# Patient Record
Sex: MALE | Race: BLACK OR AFRICAN AMERICAN | NOT HISPANIC OR LATINO | URBAN - METROPOLITAN AREA
[De-identification: names, ages, dates, MRNs, and addresses within clinical notes are randomized per-mention and may not be internally consistent; named-entity substitution may affect disease eponyms.]

---

## 2019-09-08 ENCOUNTER — EMERGENCY (EMERGENCY)
Facility: HOSPITAL | Age: 34
LOS: 0 days | Discharge: LEFT BEFORE DISCHARGE | End: 2019-09-08
Attending: EMERGENCY MEDICINE
Payer: COMMERCIAL

## 2019-09-08 VITALS
RESPIRATION RATE: 18 BRPM | OXYGEN SATURATION: 98 % | DIASTOLIC BLOOD PRESSURE: 100 MMHG | TEMPERATURE: 99 F | HEART RATE: 126 BPM | SYSTOLIC BLOOD PRESSURE: 165 MMHG

## 2019-09-08 VITALS — WEIGHT: 209 LBS | HEIGHT: 66 IN

## 2019-09-08 DIAGNOSIS — N50.811 RIGHT TESTICULAR PAIN: ICD-10-CM

## 2019-09-08 DIAGNOSIS — N50.819 TESTICULAR PAIN, UNSPECIFIED: ICD-10-CM

## 2019-09-08 DIAGNOSIS — R36.1 HEMATOSPERMIA: ICD-10-CM

## 2019-09-08 DIAGNOSIS — Z11.3 ENCOUNTER FOR SCREENING FOR INFECTIONS WITH A PREDOMINANTLY SEXUAL MODE OF TRANSMISSION: ICD-10-CM

## 2019-09-08 LAB
APPEARANCE UR: CLEAR — SIGNIFICANT CHANGE UP
BILIRUB UR-MCNC: NEGATIVE — SIGNIFICANT CHANGE UP
COLOR SPEC: YELLOW — SIGNIFICANT CHANGE UP
DIFF PNL FLD: ABNORMAL
GLUCOSE UR QL: NEGATIVE MG/DL — SIGNIFICANT CHANGE UP
KETONES UR-MCNC: NEGATIVE — SIGNIFICANT CHANGE UP
LEUKOCYTE ESTERASE UR-ACNC: NEGATIVE — SIGNIFICANT CHANGE UP
NITRITE UR-MCNC: NEGATIVE — SIGNIFICANT CHANGE UP
PH UR: 6 — SIGNIFICANT CHANGE UP (ref 5–8)
PROT UR-MCNC: 30 MG/DL
SP GR SPEC: 1.02 — SIGNIFICANT CHANGE UP (ref 1.01–1.02)
UROBILINOGEN FLD QL: NEGATIVE MG/DL — SIGNIFICANT CHANGE UP

## 2019-09-08 PROCEDURE — 87491 CHLMYD TRACH DNA AMP PROBE: CPT

## 2019-09-08 PROCEDURE — 99284 EMERGENCY DEPT VISIT MOD MDM: CPT

## 2019-09-08 PROCEDURE — 87086 URINE CULTURE/COLONY COUNT: CPT

## 2019-09-08 PROCEDURE — 81001 URINALYSIS AUTO W/SCOPE: CPT

## 2019-09-08 PROCEDURE — 87591 N.GONORRHOEAE DNA AMP PROB: CPT

## 2019-09-08 PROCEDURE — 99285 EMERGENCY DEPT VISIT HI MDM: CPT

## 2019-09-08 RX ORDER — IBUPROFEN 200 MG
600 TABLET ORAL ONCE
Refills: 0 | Status: DISCONTINUED | OUTPATIENT
Start: 2019-09-08 | End: 2019-09-08

## 2019-09-08 NOTE — ED STATDOCS - PROGRESS NOTE DETAILS
Pt reportedly became angry after hearing what his bill may be from registration and left ED immediately after that, requesting everything be cancelled.  Pt left quickly without seeing nursing or MD and hence did not sign AMA form.

## 2019-09-08 NOTE — ED ADULT NURSE NOTE - OBJECTIVE STATEMENT
PT C/O testicular pain started 2 weeks ago. pt states when he is ejaculating he noticed blood. no urinary complains. pt denies insertion of any object to his penis.

## 2019-09-08 NOTE — ED ADULT NURSE NOTE - CHPI ED NUR SYMPTOMS NEG
no vomiting/no nausea/no tingling/no weakness/no chills/no dizziness/no fever/no decreased eating/drinking

## 2019-09-09 LAB
C TRACH RRNA SPEC QL NAA+PROBE: SIGNIFICANT CHANGE UP
CULTURE RESULTS: SIGNIFICANT CHANGE UP
N GONORRHOEA RRNA SPEC QL NAA+PROBE: SIGNIFICANT CHANGE UP
SPECIMEN SOURCE: SIGNIFICANT CHANGE UP
SPECIMEN SOURCE: SIGNIFICANT CHANGE UP

## 2020-01-30 NOTE — ED STATDOCS - GENITOURINARY, MLM
normal... no bladder tenderness, no bilateral CVA tenderness.  penis and BL testicles normal in appearance,  normal cremasteric reflex.  normal scrotum, no rash R shoulder

## 2020-06-11 NOTE — ED ADULT NURSE NOTE - NS ED NOTE ABUSE RESPONSE YN
Central Prior Authorization Team  Phone: [phone]    PA Initiation    Medication: (LANTUS SOLOSTAR) 100 UNIT/ML pen  Insurance Company: [employer] - Phone [phone] Fax [phone]  Pharmacy Filling the Rx: [facility] [place], [place] - [address], [id number]  Filling Pharmacy Phone: [phone]  Filling Pharmacy Fax:    Start Date: 6/11/2020         Yes

## 2021-02-10 PROBLEM — Z78.9 OTHER SPECIFIED HEALTH STATUS: Chronic | Status: ACTIVE | Noted: 2019-09-08

## 2021-02-12 ENCOUNTER — NON-APPOINTMENT (OUTPATIENT)
Age: 36
End: 2021-02-12

## 2021-02-12 ENCOUNTER — APPOINTMENT (OUTPATIENT)
Dept: INTERNAL MEDICINE | Facility: CLINIC | Age: 36
End: 2021-02-12
Payer: COMMERCIAL

## 2021-02-12 VITALS — SYSTOLIC BLOOD PRESSURE: 150 MMHG | DIASTOLIC BLOOD PRESSURE: 100 MMHG

## 2021-02-12 VITALS
SYSTOLIC BLOOD PRESSURE: 160 MMHG | BODY MASS INDEX: 35.03 KG/M2 | WEIGHT: 218 LBS | TEMPERATURE: 98 F | OXYGEN SATURATION: 98 % | HEIGHT: 66 IN | HEART RATE: 115 BPM | DIASTOLIC BLOOD PRESSURE: 116 MMHG

## 2021-02-12 DIAGNOSIS — Z23 ENCOUNTER FOR IMMUNIZATION: ICD-10-CM

## 2021-02-12 DIAGNOSIS — Z78.9 OTHER SPECIFIED HEALTH STATUS: ICD-10-CM

## 2021-02-12 DIAGNOSIS — Z11.3 ENCOUNTER FOR SCREENING FOR INFECTIONS WITH A PREDOMINANTLY SEXUAL MODE OF TRANSMISSION: ICD-10-CM

## 2021-02-12 DIAGNOSIS — Z82.49 FAMILY HISTORY OF ISCHEMIC HEART DISEASE AND OTHER DISEASES OF THE CIRCULATORY SYSTEM: ICD-10-CM

## 2021-02-12 DIAGNOSIS — Z11.59 ENCOUNTER FOR SCREENING FOR OTHER VIRAL DISEASES: ICD-10-CM

## 2021-02-12 DIAGNOSIS — Z00.00 ENCOUNTER FOR GENERAL ADULT MEDICAL EXAMINATION W/OUT ABNORMAL FINDINGS: ICD-10-CM

## 2021-02-12 PROCEDURE — 36415 COLL VENOUS BLD VENIPUNCTURE: CPT

## 2021-02-12 PROCEDURE — 99385 PREV VISIT NEW AGE 18-39: CPT | Mod: 25

## 2021-02-12 PROCEDURE — 93000 ELECTROCARDIOGRAM COMPLETE: CPT

## 2021-02-12 PROCEDURE — 99072 ADDL SUPL MATRL&STAF TM PHE: CPT

## 2021-02-16 DIAGNOSIS — E55.9 VITAMIN D DEFICIENCY, UNSPECIFIED: ICD-10-CM

## 2021-02-16 LAB
25(OH)D3 SERPL-MCNC: 10.4 NG/ML
ALBUMIN SERPL ELPH-MCNC: 4.7 G/DL
ALP BLD-CCNC: 53 U/L
ALT SERPL-CCNC: 41 U/L
ANION GAP SERPL CALC-SCNC: 13 MMOL/L
AST SERPL-CCNC: 32 U/L
BASOPHILS # BLD AUTO: 0.03 K/UL
BASOPHILS NFR BLD AUTO: 0.6 %
BILIRUB SERPL-MCNC: 0.2 MG/DL
BUN SERPL-MCNC: 13 MG/DL
C TRACH RRNA SPEC QL NAA+PROBE: NOT DETECTED
CALCIUM SERPL-MCNC: 10.3 MG/DL
CHLORIDE SERPL-SCNC: 100 MMOL/L
CHOLEST SERPL-MCNC: 274 MG/DL
CO2 SERPL-SCNC: 24 MMOL/L
CREAT SERPL-MCNC: 0.97 MG/DL
EOSINOPHIL # BLD AUTO: 0.06 K/UL
EOSINOPHIL NFR BLD AUTO: 1.2 %
ESTIMATED AVERAGE GLUCOSE: 126 MG/DL
GLUCOSE SERPL-MCNC: 92 MG/DL
HAV IGM SER QL: NONREACTIVE
HBA1C MFR BLD HPLC: 6 %
HBV CORE IGM SER QL: NONREACTIVE
HBV SURFACE AG SER QL: NONREACTIVE
HCT VFR BLD CALC: 46.8 %
HCV AB SER QL: NONREACTIVE
HCV S/CO RATIO: 0.07 S/CO
HDLC SERPL-MCNC: 55 MG/DL
HGB BLD-MCNC: 15.3 G/DL
HIV1+2 AB SPEC QL IA.RAPID: NONREACTIVE
IMM GRANULOCYTES NFR BLD AUTO: 0.2 %
LDLC SERPL CALC-MCNC: NORMAL MG/DL
LYMPHOCYTES # BLD AUTO: 1.6 K/UL
LYMPHOCYTES NFR BLD AUTO: 33.2 %
MAN DIFF?: NORMAL
MCHC RBC-ENTMCNC: 30.7 PG
MCHC RBC-ENTMCNC: 32.7 GM/DL
MCV RBC AUTO: 94 FL
MONOCYTES # BLD AUTO: 0.52 K/UL
MONOCYTES NFR BLD AUTO: 10.8 %
N GONORRHOEA RRNA SPEC QL NAA+PROBE: NOT DETECTED
NEUTROPHILS # BLD AUTO: 2.6 K/UL
NEUTROPHILS NFR BLD AUTO: 54 %
NONHDLC SERPL-MCNC: 219 MG/DL
PLATELET # BLD AUTO: 312 K/UL
POTASSIUM SERPL-SCNC: 4.3 MMOL/L
PROT SERPL-MCNC: 7.4 G/DL
RBC # BLD: 4.98 M/UL
RBC # FLD: 14.6 %
SARS-COV-2 IGG SERPL IA-ACNC: 0.07 INDEX
SARS-COV-2 IGG SERPL QL IA: NEGATIVE
SARS-COV-2 N GENE NPH QL NAA+PROBE: NOT DETECTED
SODIUM SERPL-SCNC: 138 MMOL/L
SOURCE AMPLIFICATION: NORMAL
T PALLIDUM AB SER QL IA: NEGATIVE
TRIGL SERPL-MCNC: 651 MG/DL
TSH SERPL-ACNC: 2.38 UIU/ML
WBC # FLD AUTO: 4.82 K/UL

## 2021-02-16 RX ORDER — ERGOCALCIFEROL 1.25 MG/1
1.25 MG CAPSULE, LIQUID FILLED ORAL
Qty: 6 | Refills: 0 | Status: ACTIVE | COMMUNITY
Start: 2021-02-16 | End: 1900-01-01

## 2021-02-17 ENCOUNTER — APPOINTMENT (OUTPATIENT)
Dept: INTERNAL MEDICINE | Facility: CLINIC | Age: 36
End: 2021-02-17
Payer: COMMERCIAL

## 2021-02-17 VITALS
OXYGEN SATURATION: 98 % | WEIGHT: 218 LBS | HEIGHT: 66 IN | SYSTOLIC BLOOD PRESSURE: 144 MMHG | HEART RATE: 87 BPM | TEMPERATURE: 98.1 F | BODY MASS INDEX: 35.03 KG/M2 | DIASTOLIC BLOOD PRESSURE: 96 MMHG

## 2021-02-17 VITALS — DIASTOLIC BLOOD PRESSURE: 95 MMHG | SYSTOLIC BLOOD PRESSURE: 150 MMHG

## 2021-02-17 DIAGNOSIS — R00.0 TACHYCARDIA, UNSPECIFIED: ICD-10-CM

## 2021-02-17 PROCEDURE — 99213 OFFICE O/P EST LOW 20 MIN: CPT | Mod: 25

## 2021-02-17 PROCEDURE — 99072 ADDL SUPL MATRL&STAF TM PHE: CPT

## 2021-02-17 PROCEDURE — 36415 COLL VENOUS BLD VENIPUNCTURE: CPT

## 2021-02-17 RX ORDER — METOPROLOL SUCCINATE 25 MG/1
25 TABLET, EXTENDED RELEASE ORAL
Qty: 30 | Refills: 0 | Status: DISCONTINUED | COMMUNITY
Start: 2021-02-12 | End: 2021-02-17

## 2021-02-18 ENCOUNTER — APPOINTMENT (OUTPATIENT)
Dept: CARDIOLOGY | Facility: CLINIC | Age: 36
End: 2021-02-18

## 2021-02-18 LAB
CHOLEST SERPL-MCNC: 288 MG/DL
ESTIMATED AVERAGE GLUCOSE: 131 MG/DL
HBA1C MFR BLD HPLC: 6.2 %
HDLC SERPL-MCNC: 53 MG/DL
LDLC SERPL CALC-MCNC: 181 MG/DL
NONHDLC SERPL-MCNC: 235 MG/DL
TRIGL SERPL-MCNC: 274 MG/DL

## 2021-02-23 ENCOUNTER — APPOINTMENT (OUTPATIENT)
Dept: INTERNAL MEDICINE | Facility: CLINIC | Age: 36
End: 2021-02-23
Payer: COMMERCIAL

## 2021-02-23 VITALS
DIASTOLIC BLOOD PRESSURE: 89 MMHG | HEIGHT: 66 IN | OXYGEN SATURATION: 97 % | TEMPERATURE: 98.5 F | BODY MASS INDEX: 35.36 KG/M2 | WEIGHT: 220 LBS | HEART RATE: 76 BPM | SYSTOLIC BLOOD PRESSURE: 135 MMHG

## 2021-02-23 VITALS — SYSTOLIC BLOOD PRESSURE: 140 MMHG | DIASTOLIC BLOOD PRESSURE: 88 MMHG

## 2021-02-23 PROCEDURE — 99072 ADDL SUPL MATRL&STAF TM PHE: CPT

## 2021-02-23 PROCEDURE — 99213 OFFICE O/P EST LOW 20 MIN: CPT

## 2021-03-01 ENCOUNTER — APPOINTMENT (OUTPATIENT)
Dept: CARDIOLOGY | Facility: CLINIC | Age: 36
End: 2021-03-01
Payer: COMMERCIAL

## 2021-03-01 ENCOUNTER — NON-APPOINTMENT (OUTPATIENT)
Age: 36
End: 2021-03-01

## 2021-03-01 VITALS
OXYGEN SATURATION: 97 % | BODY MASS INDEX: 35.68 KG/M2 | DIASTOLIC BLOOD PRESSURE: 123 MMHG | WEIGHT: 222 LBS | HEIGHT: 66 IN | SYSTOLIC BLOOD PRESSURE: 163 MMHG | HEART RATE: 95 BPM | TEMPERATURE: 97.7 F

## 2021-03-01 VITALS
WEIGHT: 222 LBS | OXYGEN SATURATION: 97 % | TEMPERATURE: 97.7 F | HEART RATE: 95 BPM | BODY MASS INDEX: 35.83 KG/M2 | DIASTOLIC BLOOD PRESSURE: 123 MMHG | SYSTOLIC BLOOD PRESSURE: 163 MMHG

## 2021-03-01 DIAGNOSIS — R00.0 TACHYCARDIA, UNSPECIFIED: ICD-10-CM

## 2021-03-01 DIAGNOSIS — R73.03 PREDIABETES.: ICD-10-CM

## 2021-03-01 PROCEDURE — 93000 ELECTROCARDIOGRAM COMPLETE: CPT

## 2021-03-01 PROCEDURE — 99204 OFFICE O/P NEW MOD 45 MIN: CPT

## 2021-03-01 PROCEDURE — 99072 ADDL SUPL MATRL&STAF TM PHE: CPT

## 2021-03-11 ENCOUNTER — APPOINTMENT (OUTPATIENT)
Dept: CARDIOLOGY | Facility: CLINIC | Age: 36
End: 2021-03-11

## 2021-03-16 ENCOUNTER — APPOINTMENT (OUTPATIENT)
Dept: INTERNAL MEDICINE | Facility: CLINIC | Age: 36
End: 2021-03-16

## 2021-04-05 ENCOUNTER — APPOINTMENT (OUTPATIENT)
Dept: CARDIOLOGY | Facility: CLINIC | Age: 36
End: 2021-04-05

## 2021-04-05 NOTE — REASON FOR VISIT
[FreeTextEntry1] : This is the first office visit for this 35-year-old black male who was referred because of hypertension and sinus tachycardia.  The patient was recently placed on Toprol-XL.  First he was on 25 mg and then was increased to 2 tablets daily.  Upon questioning the patient also has a problem with snoring according to his girlfriend and he sometimes feels like he is waking up with shortness of breath..  He does have 3 caffeinated beverages each day.  He has on average four alcoholic beverages on the weekend.  He was never  a smoker.\par \par His mother has a history of hypertension.\par \par He takes Toprol-XL 50 mg and vitamin D.\par \par He has no known drug allergies.\par \par He denies any chest pains or palpitations but does have the previously mentioned shortness of breath intermittently when he wakes up.

## 2021-04-05 NOTE — PHYSICAL EXAM
[General Appearance - Well Developed] : well developed [Normal Appearance] : normal appearance [Well Groomed] : well groomed [No Deformities] : no deformities [General Appearance - In No Acute Distress] : no acute distress [Normal Conjunctiva] : the conjunctiva exhibited no abnormalities [Eyelids - No Xanthelasma] : the eyelids demonstrated no xanthelasmas [Normal Oral Mucosa] : normal oral mucosa [No Oral Pallor] : no oral pallor [No Oral Cyanosis] : no oral cyanosis [Normal Jugular Venous A Waves Present] : normal jugular venous A waves present [Normal Jugular Venous V Waves Present] : normal jugular venous V waves present [No Jugular Venous Blackburn A Waves] : no jugular venous blackburn A waves [Heart Rate And Rhythm] : heart rate and rhythm were normal [Heart Sounds] : normal S1 and S2 [Murmurs] : no murmurs present [Respiration, Rhythm And Depth] : normal respiratory rhythm and effort [Exaggerated Use Of Accessory Muscles For Inspiration] : no accessory muscle use [Auscultation Breath Sounds / Voice Sounds] : lungs were clear to auscultation bilaterally [Abnormal Walk] : normal gait [Gait - Sufficient For Exercise Testing] : the gait was sufficient for exercise testing [Nail Clubbing] : no clubbing of the fingernails [Cyanosis, Localized] : no localized cyanosis [Petechial Hemorrhages (___cm)] : no petechial hemorrhages [Skin Color & Pigmentation] : normal skin color and pigmentation [] : no rash [No Venous Stasis] : no venous stasis [Skin Lesions] : no skin lesions [No Skin Ulcers] : no skin ulcer [No Xanthoma] : no  xanthoma was observed [Oriented To Time, Place, And Person] : oriented to person, place, and time [Affect] : the affect was normal [Mood] : the mood was normal [No Anxiety] : not feeling anxious [FreeTextEntry1] : obese

## 2021-04-05 NOTE — DISCUSSION/SUMMARY
[FreeTextEntry1] : The patient was examined.  His blood pressure was 163/123, and his pulse was 95.  His lungs were clear to auscultation.  Cardiac exam was negative for murmurs rubs or gallops.  His EKG showed normal sinus rhythm and was within normal limits.  The patient was told to gradually and slowly cut down on all caffeine intake.  We will send the patient for an echocardiogram for LV size and function.  He gives a history that is highly suspicious for obstructive sleep apnea.  He probably should be referred for a sleep study to a pulmonologist for this.  We will increase his Toprol-XL to 100 mg daily.  He will return in 1 month or earlier if needed.  Please note, I have spent a total of 50 minutes on the date of the encounter evaluating and treating the patient.

## 2021-11-21 ENCOUNTER — INPATIENT (INPATIENT)
Facility: HOSPITAL | Age: 36
LOS: 4 days | Discharge: HOME | DRG: 964 | End: 2021-11-26
Attending: SURGERY | Admitting: SURGERY
Payer: SELF-PAY

## 2021-11-21 VITALS
TEMPERATURE: 99 F | SYSTOLIC BLOOD PRESSURE: 138 MMHG | HEART RATE: 115 BPM | HEIGHT: 66 IN | RESPIRATION RATE: 24 BRPM | OXYGEN SATURATION: 98 % | DIASTOLIC BLOOD PRESSURE: 99 MMHG | WEIGHT: 220.02 LBS
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
## 2021-11-21 DIAGNOSIS — R74.02 ELEVATION OF LEVELS OF LACTIC ACID DEHYDROGENASE [LDH]: ICD-10-CM

## 2021-11-21 DIAGNOSIS — S20.20XA CONTUSION OF THORAX, UNSPECIFIED, INITIAL ENCOUNTER: ICD-10-CM

## 2021-11-21 DIAGNOSIS — N28.0 ISCHEMIA AND INFARCTION OF KIDNEY: ICD-10-CM

## 2021-11-21 DIAGNOSIS — F10.10 ALCOHOL ABUSE, UNCOMPLICATED: ICD-10-CM

## 2021-11-21 DIAGNOSIS — R79.89 OTHER SPECIFIED ABNORMAL FINDINGS OF BLOOD CHEMISTRY: ICD-10-CM

## 2021-11-21 DIAGNOSIS — S37.019A MINOR CONTUSION OF UNSPECIFIED KIDNEY, INITIAL ENCOUNTER: ICD-10-CM

## 2021-11-21 LAB
ALBUMIN SERPL ELPH-MCNC: 3.9 G/DL — SIGNIFICANT CHANGE UP (ref 3.3–5)
ALP SERPL-CCNC: 50 U/L — SIGNIFICANT CHANGE UP (ref 40–120)
ALT FLD-CCNC: 155 U/L — HIGH (ref 12–78)
ANION GAP SERPL CALC-SCNC: 11 MMOL/L — SIGNIFICANT CHANGE UP (ref 5–17)
APTT BLD: 27.5 SEC — SIGNIFICANT CHANGE UP (ref 27.5–35.5)
AST SERPL-CCNC: 267 U/L — HIGH (ref 15–37)
BASOPHILS # BLD AUTO: 0.01 K/UL — SIGNIFICANT CHANGE UP (ref 0–0.2)
BASOPHILS NFR BLD AUTO: 0.1 % — SIGNIFICANT CHANGE UP (ref 0–2)
BILIRUB SERPL-MCNC: 0.6 MG/DL — SIGNIFICANT CHANGE UP (ref 0.2–1.2)
BUN SERPL-MCNC: 20 MG/DL — SIGNIFICANT CHANGE UP (ref 7–23)
CALCIUM SERPL-MCNC: 9.6 MG/DL — SIGNIFICANT CHANGE UP (ref 8.5–10.1)
CHLORIDE SERPL-SCNC: 104 MMOL/L — SIGNIFICANT CHANGE UP (ref 96–108)
CO2 SERPL-SCNC: 22 MMOL/L — SIGNIFICANT CHANGE UP (ref 22–31)
CREAT SERPL-MCNC: 1.82 MG/DL — HIGH (ref 0.5–1.3)
EOSINOPHIL # BLD AUTO: 0 K/UL — SIGNIFICANT CHANGE UP (ref 0–0.5)
EOSINOPHIL NFR BLD AUTO: 0 % — SIGNIFICANT CHANGE UP (ref 0–6)
ETHANOL SERPL-MCNC: 41 MG/DL — HIGH (ref 0–10)
GLUCOSE SERPL-MCNC: 139 MG/DL — HIGH (ref 70–99)
HCT VFR BLD CALC: 42.7 % — SIGNIFICANT CHANGE UP (ref 39–50)
HGB BLD-MCNC: 14.1 G/DL — SIGNIFICANT CHANGE UP (ref 13–17)
IMM GRANULOCYTES NFR BLD AUTO: 0.3 % — SIGNIFICANT CHANGE UP (ref 0–1.5)
INR BLD: 1.07 RATIO — SIGNIFICANT CHANGE UP (ref 0.88–1.16)
LACTATE SERPL-SCNC: 3.3 MMOL/L — HIGH (ref 0.7–2)
LIDOCAIN IGE QN: 265 U/L — SIGNIFICANT CHANGE UP (ref 73–393)
LYMPHOCYTES # BLD AUTO: 1.02 K/UL — SIGNIFICANT CHANGE UP (ref 1–3.3)
LYMPHOCYTES # BLD AUTO: 11.6 % — LOW (ref 13–44)
MCHC RBC-ENTMCNC: 30.3 PG — SIGNIFICANT CHANGE UP (ref 27–34)
MCHC RBC-ENTMCNC: 33 GM/DL — SIGNIFICANT CHANGE UP (ref 32–36)
MCV RBC AUTO: 91.8 FL — SIGNIFICANT CHANGE UP (ref 80–100)
MONOCYTES # BLD AUTO: 0.73 K/UL — SIGNIFICANT CHANGE UP (ref 0–0.9)
MONOCYTES NFR BLD AUTO: 8.3 % — SIGNIFICANT CHANGE UP (ref 2–14)
NEUTROPHILS # BLD AUTO: 7 K/UL — SIGNIFICANT CHANGE UP (ref 1.8–7.4)
NEUTROPHILS NFR BLD AUTO: 79.7 % — HIGH (ref 43–77)
PLATELET # BLD AUTO: 319 K/UL — SIGNIFICANT CHANGE UP (ref 150–400)
POTASSIUM SERPL-MCNC: 4.1 MMOL/L — SIGNIFICANT CHANGE UP (ref 3.5–5.3)
POTASSIUM SERPL-SCNC: 4.1 MMOL/L — SIGNIFICANT CHANGE UP (ref 3.5–5.3)
PROT SERPL-MCNC: 7.8 GM/DL — SIGNIFICANT CHANGE UP (ref 6–8.3)
PROTHROM AB SERPL-ACNC: 12.4 SEC — SIGNIFICANT CHANGE UP (ref 10.6–13.6)
RBC # BLD: 4.65 M/UL — SIGNIFICANT CHANGE UP (ref 4.2–5.8)
RBC # FLD: 16 % — HIGH (ref 10.3–14.5)
SODIUM SERPL-SCNC: 137 MMOL/L — SIGNIFICANT CHANGE UP (ref 135–145)
WBC # BLD: 8.79 K/UL — SIGNIFICANT CHANGE UP (ref 3.8–10.5)
WBC # FLD AUTO: 8.79 K/UL — SIGNIFICANT CHANGE UP (ref 3.8–10.5)

## 2021-11-21 PROCEDURE — 70486 CT MAXILLOFACIAL W/O DYE: CPT | Mod: 26,MA

## 2021-11-21 PROCEDURE — 93010 ELECTROCARDIOGRAM REPORT: CPT

## 2021-11-21 PROCEDURE — 83605 ASSAY OF LACTIC ACID: CPT

## 2021-11-21 PROCEDURE — 85025 COMPLETE CBC W/AUTO DIFF WBC: CPT

## 2021-11-21 PROCEDURE — 72125 CT NECK SPINE W/O DYE: CPT | Mod: 26,MA

## 2021-11-21 PROCEDURE — 80048 BASIC METABOLIC PNL TOTAL CA: CPT

## 2021-11-21 PROCEDURE — 73110 X-RAY EXAM OF WRIST: CPT | Mod: 26,RT

## 2021-11-21 PROCEDURE — 97116 GAIT TRAINING THERAPY: CPT | Mod: GP

## 2021-11-21 PROCEDURE — 99221 1ST HOSP IP/OBS SF/LOW 40: CPT

## 2021-11-21 PROCEDURE — 76376 3D RENDER W/INTRP POSTPROCES: CPT | Mod: 26

## 2021-11-21 PROCEDURE — 97162 PT EVAL MOD COMPLEX 30 MIN: CPT | Mod: GP

## 2021-11-21 PROCEDURE — 86769 SARS-COV-2 COVID-19 ANTIBODY: CPT

## 2021-11-21 PROCEDURE — 36415 COLL VENOUS BLD VENIPUNCTURE: CPT

## 2021-11-21 PROCEDURE — 84100 ASSAY OF PHOSPHORUS: CPT

## 2021-11-21 PROCEDURE — 81001 URINALYSIS AUTO W/SCOPE: CPT

## 2021-11-21 PROCEDURE — 74177 CT ABD & PELVIS W/CONTRAST: CPT | Mod: 26,MA

## 2021-11-21 PROCEDURE — 85018 HEMOGLOBIN: CPT

## 2021-11-21 PROCEDURE — 74177 CT ABD & PELVIS W/CONTRAST: CPT

## 2021-11-21 PROCEDURE — 99285 EMERGENCY DEPT VISIT HI MDM: CPT

## 2021-11-21 PROCEDURE — 85014 HEMATOCRIT: CPT

## 2021-11-21 PROCEDURE — 82550 ASSAY OF CK (CPK): CPT

## 2021-11-21 PROCEDURE — 70450 CT HEAD/BRAIN W/O DYE: CPT | Mod: 26,MA

## 2021-11-21 PROCEDURE — 83735 ASSAY OF MAGNESIUM: CPT

## 2021-11-21 PROCEDURE — 73130 X-RAY EXAM OF HAND: CPT | Mod: 26,RT

## 2021-11-21 PROCEDURE — 71260 CT THORAX DX C+: CPT | Mod: 26,MA

## 2021-11-21 PROCEDURE — 73090 X-RAY EXAM OF FOREARM: CPT | Mod: 26,RT

## 2021-11-21 PROCEDURE — 85027 COMPLETE CBC AUTOMATED: CPT

## 2021-11-21 RX ORDER — ONDANSETRON 8 MG/1
4 TABLET, FILM COATED ORAL ONCE
Refills: 0 | Status: COMPLETED | OUTPATIENT
Start: 2021-11-21 | End: 2021-11-21

## 2021-11-21 RX ORDER — MORPHINE SULFATE 50 MG/1
4 CAPSULE, EXTENDED RELEASE ORAL ONCE
Refills: 0 | Status: DISCONTINUED | OUTPATIENT
Start: 2021-11-21 | End: 2021-11-21

## 2021-11-21 RX ORDER — ONDANSETRON 8 MG/1
4 TABLET, FILM COATED ORAL EVERY 6 HOURS
Refills: 0 | Status: DISCONTINUED | OUTPATIENT
Start: 2021-11-21 | End: 2021-11-26

## 2021-11-21 RX ORDER — HYDROMORPHONE HYDROCHLORIDE 2 MG/ML
1 INJECTION INTRAMUSCULAR; INTRAVENOUS; SUBCUTANEOUS EVERY 6 HOURS
Refills: 0 | Status: DISCONTINUED | OUTPATIENT
Start: 2021-11-21 | End: 2021-11-22

## 2021-11-21 RX ORDER — MORPHINE SULFATE 50 MG/1
2 CAPSULE, EXTENDED RELEASE ORAL EVERY 6 HOURS
Refills: 0 | Status: DISCONTINUED | OUTPATIENT
Start: 2021-11-21 | End: 2021-11-21

## 2021-11-21 RX ORDER — SODIUM CHLORIDE 9 MG/ML
1000 INJECTION INTRAMUSCULAR; INTRAVENOUS; SUBCUTANEOUS ONCE
Refills: 0 | Status: COMPLETED | OUTPATIENT
Start: 2021-11-21 | End: 2021-11-21

## 2021-11-21 RX ORDER — OXYCODONE HYDROCHLORIDE 5 MG/1
5 TABLET ORAL EVERY 4 HOURS
Refills: 0 | Status: DISCONTINUED | OUTPATIENT
Start: 2021-11-21 | End: 2021-11-22

## 2021-11-21 RX ORDER — HYDROMORPHONE HYDROCHLORIDE 2 MG/ML
1 INJECTION INTRAMUSCULAR; INTRAVENOUS; SUBCUTANEOUS ONCE
Refills: 0 | Status: DISCONTINUED | OUTPATIENT
Start: 2021-11-21 | End: 2021-11-21

## 2021-11-21 RX ORDER — ACETAMINOPHEN 500 MG
650 TABLET ORAL EVERY 6 HOURS
Refills: 0 | Status: DISCONTINUED | OUTPATIENT
Start: 2021-11-21 | End: 2021-11-26

## 2021-11-21 RX ORDER — SODIUM CHLORIDE 9 MG/ML
3 INJECTION INTRAMUSCULAR; INTRAVENOUS; SUBCUTANEOUS EVERY 8 HOURS
Refills: 0 | Status: DISCONTINUED | OUTPATIENT
Start: 2021-11-21 | End: 2021-11-26

## 2021-11-21 RX ORDER — INFLUENZA VIRUS VACCINE 15; 15; 15; 15 UG/.5ML; UG/.5ML; UG/.5ML; UG/.5ML
0.5 SUSPENSION INTRAMUSCULAR ONCE
Refills: 0 | Status: COMPLETED | OUTPATIENT
Start: 2021-11-21 | End: 2021-11-21

## 2021-11-21 RX ORDER — CX-024414 0.2 MG/ML
0.5 INJECTION, SUSPENSION INTRAMUSCULAR
Qty: 0 | Refills: 0 | DISCHARGE

## 2021-11-21 RX ORDER — HEPARIN SODIUM 5000 [USP'U]/ML
5000 INJECTION INTRAVENOUS; SUBCUTANEOUS EVERY 8 HOURS
Refills: 0 | Status: DISCONTINUED | OUTPATIENT
Start: 2021-11-21 | End: 2021-11-26

## 2021-11-21 RX ADMIN — HYDROMORPHONE HYDROCHLORIDE 1 MILLIGRAM(S): 2 INJECTION INTRAMUSCULAR; INTRAVENOUS; SUBCUTANEOUS at 18:09

## 2021-11-21 RX ADMIN — HEPARIN SODIUM 5000 UNIT(S): 5000 INJECTION INTRAVENOUS; SUBCUTANEOUS at 13:18

## 2021-11-21 RX ADMIN — SODIUM CHLORIDE 2000 MILLILITER(S): 9 INJECTION INTRAMUSCULAR; INTRAVENOUS; SUBCUTANEOUS at 08:43

## 2021-11-21 RX ADMIN — SODIUM CHLORIDE 3 MILLILITER(S): 9 INJECTION INTRAMUSCULAR; INTRAVENOUS; SUBCUTANEOUS at 22:15

## 2021-11-21 RX ADMIN — MORPHINE SULFATE 4 MILLIGRAM(S): 50 CAPSULE, EXTENDED RELEASE ORAL at 09:50

## 2021-11-21 RX ADMIN — OXYCODONE HYDROCHLORIDE 5 MILLIGRAM(S): 5 TABLET ORAL at 22:14

## 2021-11-21 RX ADMIN — SODIUM CHLORIDE 3 MILLILITER(S): 9 INJECTION INTRAMUSCULAR; INTRAVENOUS; SUBCUTANEOUS at 13:18

## 2021-11-21 RX ADMIN — MORPHINE SULFATE 4 MILLIGRAM(S): 50 CAPSULE, EXTENDED RELEASE ORAL at 07:54

## 2021-11-21 RX ADMIN — HYDROMORPHONE HYDROCHLORIDE 1 MILLIGRAM(S): 2 INJECTION INTRAMUSCULAR; INTRAVENOUS; SUBCUTANEOUS at 17:54

## 2021-11-21 RX ADMIN — MORPHINE SULFATE 4 MILLIGRAM(S): 50 CAPSULE, EXTENDED RELEASE ORAL at 08:55

## 2021-11-21 RX ADMIN — OXYCODONE HYDROCHLORIDE 5 MILLIGRAM(S): 5 TABLET ORAL at 14:59

## 2021-11-21 RX ADMIN — HYDROMORPHONE HYDROCHLORIDE 1 MILLIGRAM(S): 2 INJECTION INTRAMUSCULAR; INTRAVENOUS; SUBCUTANEOUS at 10:01

## 2021-11-21 RX ADMIN — OXYCODONE HYDROCHLORIDE 5 MILLIGRAM(S): 5 TABLET ORAL at 22:44

## 2021-11-21 RX ADMIN — HYDROMORPHONE HYDROCHLORIDE 1 MILLIGRAM(S): 2 INJECTION INTRAMUSCULAR; INTRAVENOUS; SUBCUTANEOUS at 10:45

## 2021-11-21 RX ADMIN — MORPHINE SULFATE 4 MILLIGRAM(S): 50 CAPSULE, EXTENDED RELEASE ORAL at 08:43

## 2021-11-21 RX ADMIN — HYDROMORPHONE HYDROCHLORIDE 1 MILLIGRAM(S): 2 INJECTION INTRAMUSCULAR; INTRAVENOUS; SUBCUTANEOUS at 12:00

## 2021-11-21 RX ADMIN — OXYCODONE HYDROCHLORIDE 5 MILLIGRAM(S): 5 TABLET ORAL at 15:50

## 2021-11-21 RX ADMIN — SODIUM CHLORIDE 1000 MILLILITER(S): 9 INJECTION INTRAMUSCULAR; INTRAVENOUS; SUBCUTANEOUS at 07:54

## 2021-11-21 RX ADMIN — HEPARIN SODIUM 5000 UNIT(S): 5000 INJECTION INTRAVENOUS; SUBCUTANEOUS at 22:14

## 2021-11-21 NOTE — ED ADULT NURSE NOTE - OBJECTIVE STATEMENT
Pt presents to rm 13, A&Ox4, ambulatory w/o assistance at baseline, here for evaluation of fall down 6 flights of stairs Pt c/o RUQ and RLQ pain, right arm and wrist pain, left periorbital area w/ positive LOC yesterday. Pt denies use of blood thinners. No bruising/ injury observed to abdomen and arms. Abrasion observed to left eyebrow. Pt denies hematuria at this time. Denies chest pain, shortness of breath, palpitations, diaphoresis, headaches, fevers, dizziness, nausea, vomiting, diarrhea, or urinary symptoms at this time. IV established in left arm with a 18G, labs drawn and sent, call bell in reach, warm blanket provided, bed in lowest position, side rails up x2, MD evaluation in progress, pt on telemetry. Will continue to monitor.

## 2021-11-21 NOTE — ED ADULT NURSE NOTE - CHIEF COMPLAINT QUOTE
Pt presents to er with complaints of unwitnessed fall down 5-6 steps in his basement last night around 10pm, pt reports injuries to bilateral abdomen, RUE, left periorbital edema with laceration, unknown LOC however pt reports he woke up on the floor, neg use of blood thinners, denies chest pain, sob at this time, family reports they tried to encourage pt to come for evaluation last night, denies significant medical history.  [id number]

## 2021-11-21 NOTE — ED PROVIDER NOTE - CARE PLAN
1 Principal Discharge DX:	Renal hematoma  Secondary Diagnosis:	Fall  Secondary Diagnosis:	Closed head injury  Secondary Diagnosis:	Abrasion   Principal Discharge DX:	Renal hematoma  Secondary Diagnosis:	Fall  Secondary Diagnosis:	Closed head injury  Secondary Diagnosis:	Abrasion  Secondary Diagnosis:	Wrist sprain

## 2021-11-21 NOTE — ED PROVIDER NOTE - CLINICAL SUMMARY MEDICAL DECISION MAKING FREE TEXT BOX
35 yo pt s/p fall down 7 steps with + LOC.  Pt with abd pain and right UE pain.  plan ct scan and xrays.

## 2021-11-21 NOTE — ED ADULT TRIAGE NOTE - CHIEF COMPLAINT QUOTE
Pt presents to er with complaints of unwitnessed fall down 5-6 steps in his basement last night around 10pm, pt reports injuries to bilateral abdomen, RUE, left periorbital edema with laceration, unknown LOC however pt reports he woke up on the floor, neg use of blood thinners, denies chest pain, sob at this time, family reports they tried to encourage pt to come for evaluation last night, denies significant medical history.  [id number]

## 2021-11-21 NOTE — ED PROVIDER NOTE - PROGRESS NOTE DETAILS
Attending Huang, Dr[facility]. Attending Huang, Dr. Nogueira at bedside and can admit to Trauma service.  pt and sister updated on results Attending Huang, Dr. Nogueira called for Trauma eval for trauma alert.

## 2021-11-21 NOTE — H&P ADULT - HISTORY OF PRESENT ILLNESS
· Chief Complaint: The patient is a 36y Male complaining of fall down stairs.  · HPI Objective Statement: 37 yo pt presents to ED after fall ~7 steps.  Pt states he fell going down stairs last night at ~9pm. + loc.  + etoh.  Pt now with increasing pain to lower abd.  No vomit, no diarrhea.  No fever.  + pain right wrist.  Pt wanted to stay home, but since pain getting worse, came to ED for eval.  · Presenting Symptoms: ABRASION, LOSS OF CONSCIOUSNESS, PAIN  · Location: abdomen  · Area: lower  · Duration: yesterday  · Severity: PAIN SCALE 8 OF 10.  · Incident Location: home  · Aggravated Factors: movement  · Relieving Factors: none  Case discussed with the ER MD, patient and spouse.    Vital Signs Last 24 Hrs  T(C): 37.3 (21 Nov 2021 10:47), Max: 37.4 (21 Nov 2021 07:27)  T(F): 99.2 (21 Nov 2021 10:47), Max: 99.3 (21 Nov 2021 07:27)  HR: 96 (21 Nov 2021 10:47) (88 - 115)  BP: 163/108 (21 Nov 2021 10:47) (138/99 - 166/113)  BP(mean): 112 (21 Nov 2021 07:27) (112 - 112)  RR: 19 (21 Nov 2021 10:47) (15 - 24)  SpO2: 98% (21 Nov 2021 10:47) (96% - 100%)

## 2021-11-21 NOTE — ED ADULT NURSE NOTE - CAS EDN DISCHARGE ASSESSMENT
Alert and oriented to person, place and time/Awake Alert and oriented to person, place and time/Patient baseline mental status/Awake

## 2021-11-21 NOTE — ED ADULT NURSE NOTE - NS ED NURSE LEVEL OF CONSCIOUSNESS MENTAL STATUS
Dad called to get refills on medications.   No permission in chart to speak with him. Advised pharmacy should send the request and we could possibly fill at that time directly to the pharmacy.  Father voiced understanding.   
Awake/Alert/Cooperative

## 2021-11-21 NOTE — ED PROVIDER NOTE - OBJECTIVE STATEMENT
35 yo pt presents to ED after fall ~7 steps.  Pt states he fell going down stairs last night at ~9pm. + loc.  + etoh.  Pt now with increasing pain to lower abd.  No vomit, no diarrhea.  No fever.  + pain right wrist.  Pt wanted to stay home, but since pain getting worse, came to ED for eval.

## 2021-11-22 ENCOUNTER — NON-APPOINTMENT (OUTPATIENT)
Age: 36
End: 2021-11-22

## 2021-11-22 ENCOUNTER — TRANSCRIPTION ENCOUNTER (OUTPATIENT)
Age: 36
End: 2021-11-22

## 2021-11-22 LAB
ADD ON TEST-SPECIMEN IN LAB: SIGNIFICANT CHANGE UP
ANION GAP SERPL CALC-SCNC: 6 MMOL/L — SIGNIFICANT CHANGE UP (ref 5–17)
APPEARANCE UR: CLEAR — SIGNIFICANT CHANGE UP
BASOPHILS # BLD AUTO: 0.02 K/UL — SIGNIFICANT CHANGE UP (ref 0–0.2)
BASOPHILS NFR BLD AUTO: 0.2 % — SIGNIFICANT CHANGE UP (ref 0–2)
BILIRUB UR-MCNC: NEGATIVE — SIGNIFICANT CHANGE UP
BUN SERPL-MCNC: 11 MG/DL — SIGNIFICANT CHANGE UP (ref 7–23)
CALCIUM SERPL-MCNC: 8.8 MG/DL — SIGNIFICANT CHANGE UP (ref 8.5–10.1)
CHLORIDE SERPL-SCNC: 103 MMOL/L — SIGNIFICANT CHANGE UP (ref 96–108)
CO2 SERPL-SCNC: 25 MMOL/L — SIGNIFICANT CHANGE UP (ref 22–31)
COLOR SPEC: YELLOW — SIGNIFICANT CHANGE UP
COVID-19 SPIKE DOMAIN AB INTERP: POSITIVE
COVID-19 SPIKE DOMAIN ANTIBODY RESULT: 132 U/ML — HIGH
CREAT SERPL-MCNC: 1.5 MG/DL — HIGH (ref 0.5–1.3)
DIFF PNL FLD: ABNORMAL
EOSINOPHIL # BLD AUTO: 0.01 K/UL — SIGNIFICANT CHANGE UP (ref 0–0.5)
EOSINOPHIL NFR BLD AUTO: 0.1 % — SIGNIFICANT CHANGE UP (ref 0–6)
GLUCOSE SERPL-MCNC: 110 MG/DL — HIGH (ref 70–99)
GLUCOSE UR QL: NEGATIVE MG/DL — SIGNIFICANT CHANGE UP
HCT VFR BLD CALC: 36.9 % — LOW (ref 39–50)
HCT VFR BLD CALC: 38.4 % — LOW (ref 39–50)
HGB BLD-MCNC: 11.8 G/DL — LOW (ref 13–17)
HGB BLD-MCNC: 12.5 G/DL — LOW (ref 13–17)
IMM GRANULOCYTES NFR BLD AUTO: 0.6 % — SIGNIFICANT CHANGE UP (ref 0–1.5)
KETONES UR-MCNC: NEGATIVE — SIGNIFICANT CHANGE UP
LEUKOCYTE ESTERASE UR-ACNC: NEGATIVE — SIGNIFICANT CHANGE UP
LYMPHOCYTES # BLD AUTO: 0.99 K/UL — LOW (ref 1–3.3)
LYMPHOCYTES # BLD AUTO: 7.6 % — LOW (ref 13–44)
MAGNESIUM SERPL-MCNC: 2.1 MG/DL — SIGNIFICANT CHANGE UP (ref 1.6–2.6)
MCHC RBC-ENTMCNC: 30 PG — SIGNIFICANT CHANGE UP (ref 27–34)
MCHC RBC-ENTMCNC: 32.6 GM/DL — SIGNIFICANT CHANGE UP (ref 32–36)
MCV RBC AUTO: 92.1 FL — SIGNIFICANT CHANGE UP (ref 80–100)
MONOCYTES # BLD AUTO: 1.26 K/UL — HIGH (ref 0–0.9)
MONOCYTES NFR BLD AUTO: 9.7 % — SIGNIFICANT CHANGE UP (ref 2–14)
NEUTROPHILS # BLD AUTO: 10.64 K/UL — HIGH (ref 1.8–7.4)
NEUTROPHILS NFR BLD AUTO: 81.8 % — HIGH (ref 43–77)
NITRITE UR-MCNC: NEGATIVE — SIGNIFICANT CHANGE UP
PH UR: 5 — SIGNIFICANT CHANGE UP (ref 5–8)
PHOSPHATE SERPL-MCNC: 1.8 MG/DL — LOW (ref 2.5–4.5)
PLATELET # BLD AUTO: 237 K/UL — SIGNIFICANT CHANGE UP (ref 150–400)
POTASSIUM SERPL-MCNC: 3.9 MMOL/L — SIGNIFICANT CHANGE UP (ref 3.5–5.3)
POTASSIUM SERPL-SCNC: 3.9 MMOL/L — SIGNIFICANT CHANGE UP (ref 3.5–5.3)
PROT UR-MCNC: 100 MG/DL
RBC # BLD: 4.17 M/UL — LOW (ref 4.2–5.8)
RBC # FLD: 15.1 % — HIGH (ref 10.3–14.5)
SARS-COV-2 IGG+IGM SERPL QL IA: 132 U/ML — HIGH
SARS-COV-2 IGG+IGM SERPL QL IA: POSITIVE
SODIUM SERPL-SCNC: 134 MMOL/L — LOW (ref 135–145)
SP GR SPEC: 1.02 — SIGNIFICANT CHANGE UP (ref 1.01–1.02)
UROBILINOGEN FLD QL: NEGATIVE MG/DL — SIGNIFICANT CHANGE UP
WBC # BLD: 13 K/UL — HIGH (ref 3.8–10.5)
WBC # FLD AUTO: 13 K/UL — HIGH (ref 3.8–10.5)

## 2021-11-22 PROCEDURE — 99221 1ST HOSP IP/OBS SF/LOW 40: CPT | Mod: 25

## 2021-11-22 PROCEDURE — 99221 1ST HOSP IP/OBS SF/LOW 40: CPT

## 2021-11-22 PROCEDURE — 99253 IP/OBS CNSLTJ NEW/EST LOW 45: CPT

## 2021-11-22 PROCEDURE — 99253 IP/OBS CNSLTJ NEW/EST LOW 45: CPT | Mod: 25

## 2021-11-22 RX ORDER — HYDROMORPHONE HYDROCHLORIDE 2 MG/ML
1 INJECTION INTRAMUSCULAR; INTRAVENOUS; SUBCUTANEOUS EVERY 4 HOURS
Refills: 0 | Status: DISCONTINUED | OUTPATIENT
Start: 2021-11-22 | End: 2021-11-26

## 2021-11-22 RX ORDER — SENNA PLUS 8.6 MG/1
2 TABLET ORAL AT BEDTIME
Refills: 0 | Status: DISCONTINUED | OUTPATIENT
Start: 2021-11-22 | End: 2021-11-26

## 2021-11-22 RX ORDER — HYDROMORPHONE HYDROCHLORIDE 2 MG/ML
1 INJECTION INTRAMUSCULAR; INTRAVENOUS; SUBCUTANEOUS ONCE
Refills: 0 | Status: DISCONTINUED | OUTPATIENT
Start: 2021-11-22 | End: 2021-11-22

## 2021-11-22 RX ORDER — POLYETHYLENE GLYCOL 3350 17 G/17G
17 POWDER, FOR SOLUTION ORAL DAILY
Refills: 0 | Status: DISCONTINUED | OUTPATIENT
Start: 2021-11-22 | End: 2021-11-26

## 2021-11-22 RX ORDER — SODIUM CHLORIDE 9 MG/ML
1000 INJECTION INTRAMUSCULAR; INTRAVENOUS; SUBCUTANEOUS
Refills: 0 | Status: DISCONTINUED | OUTPATIENT
Start: 2021-11-22 | End: 2021-11-23

## 2021-11-22 RX ORDER — SIMETHICONE 80 MG/1
80 TABLET, CHEWABLE ORAL EVERY 6 HOURS
Refills: 0 | Status: DISCONTINUED | OUTPATIENT
Start: 2021-11-22 | End: 2021-11-26

## 2021-11-22 RX ADMIN — SODIUM CHLORIDE 150 MILLILITER(S): 9 INJECTION INTRAMUSCULAR; INTRAVENOUS; SUBCUTANEOUS at 19:58

## 2021-11-22 RX ADMIN — HYDROMORPHONE HYDROCHLORIDE 1 MILLIGRAM(S): 2 INJECTION INTRAMUSCULAR; INTRAVENOUS; SUBCUTANEOUS at 06:32

## 2021-11-22 RX ADMIN — Medication 650 MILLIGRAM(S): at 21:03

## 2021-11-22 RX ADMIN — SODIUM CHLORIDE 150 MILLILITER(S): 9 INJECTION INTRAMUSCULAR; INTRAVENOUS; SUBCUTANEOUS at 13:03

## 2021-11-22 RX ADMIN — HEPARIN SODIUM 5000 UNIT(S): 5000 INJECTION INTRAVENOUS; SUBCUTANEOUS at 21:03

## 2021-11-22 RX ADMIN — HYDROMORPHONE HYDROCHLORIDE 1 MILLIGRAM(S): 2 INJECTION INTRAMUSCULAR; INTRAVENOUS; SUBCUTANEOUS at 08:20

## 2021-11-22 RX ADMIN — SIMETHICONE 80 MILLIGRAM(S): 80 TABLET, CHEWABLE ORAL at 23:27

## 2021-11-22 RX ADMIN — HYDROMORPHONE HYDROCHLORIDE 1 MILLIGRAM(S): 2 INJECTION INTRAMUSCULAR; INTRAVENOUS; SUBCUTANEOUS at 08:00

## 2021-11-22 RX ADMIN — OXYCODONE HYDROCHLORIDE 5 MILLIGRAM(S): 5 TABLET ORAL at 05:47

## 2021-11-22 RX ADMIN — HEPARIN SODIUM 5000 UNIT(S): 5000 INJECTION INTRAVENOUS; SUBCUTANEOUS at 13:23

## 2021-11-22 RX ADMIN — SODIUM CHLORIDE 3 MILLILITER(S): 9 INJECTION INTRAMUSCULAR; INTRAVENOUS; SUBCUTANEOUS at 05:21

## 2021-11-22 RX ADMIN — OXYCODONE HYDROCHLORIDE 5 MILLIGRAM(S): 5 TABLET ORAL at 05:17

## 2021-11-22 RX ADMIN — SODIUM CHLORIDE 3 MILLILITER(S): 9 INJECTION INTRAMUSCULAR; INTRAVENOUS; SUBCUTANEOUS at 21:03

## 2021-11-22 RX ADMIN — HYDROMORPHONE HYDROCHLORIDE 1 MILLIGRAM(S): 2 INJECTION INTRAMUSCULAR; INTRAVENOUS; SUBCUTANEOUS at 19:48

## 2021-11-22 RX ADMIN — HYDROMORPHONE HYDROCHLORIDE 1 MILLIGRAM(S): 2 INJECTION INTRAMUSCULAR; INTRAVENOUS; SUBCUTANEOUS at 00:03

## 2021-11-22 RX ADMIN — SENNA PLUS 2 TABLET(S): 8.6 TABLET ORAL at 23:27

## 2021-11-22 RX ADMIN — OXYCODONE HYDROCHLORIDE 5 MILLIGRAM(S): 5 TABLET ORAL at 11:10

## 2021-11-22 RX ADMIN — HYDROMORPHONE HYDROCHLORIDE 1 MILLIGRAM(S): 2 INJECTION INTRAMUSCULAR; INTRAVENOUS; SUBCUTANEOUS at 16:30

## 2021-11-22 RX ADMIN — OXYCODONE HYDROCHLORIDE 5 MILLIGRAM(S): 5 TABLET ORAL at 10:23

## 2021-11-22 RX ADMIN — HYDROMORPHONE HYDROCHLORIDE 1 MILLIGRAM(S): 2 INJECTION INTRAMUSCULAR; INTRAVENOUS; SUBCUTANEOUS at 15:25

## 2021-11-22 RX ADMIN — HYDROMORPHONE HYDROCHLORIDE 1 MILLIGRAM(S): 2 INJECTION INTRAMUSCULAR; INTRAVENOUS; SUBCUTANEOUS at 20:18

## 2021-11-22 RX ADMIN — HYDROMORPHONE HYDROCHLORIDE 1 MILLIGRAM(S): 2 INJECTION INTRAMUSCULAR; INTRAVENOUS; SUBCUTANEOUS at 06:42

## 2021-11-22 RX ADMIN — HYDROMORPHONE HYDROCHLORIDE 1 MILLIGRAM(S): 2 INJECTION INTRAMUSCULAR; INTRAVENOUS; SUBCUTANEOUS at 11:17

## 2021-11-22 RX ADMIN — SODIUM CHLORIDE 3 MILLILITER(S): 9 INJECTION INTRAMUSCULAR; INTRAVENOUS; SUBCUTANEOUS at 13:18

## 2021-11-22 RX ADMIN — HEPARIN SODIUM 5000 UNIT(S): 5000 INJECTION INTRAVENOUS; SUBCUTANEOUS at 05:17

## 2021-11-22 RX ADMIN — POLYETHYLENE GLYCOL 3350 17 GRAM(S): 17 POWDER, FOR SOLUTION ORAL at 23:27

## 2021-11-22 RX ADMIN — HYDROMORPHONE HYDROCHLORIDE 1 MILLIGRAM(S): 2 INJECTION INTRAMUSCULAR; INTRAVENOUS; SUBCUTANEOUS at 11:40

## 2021-11-22 RX ADMIN — HYDROMORPHONE HYDROCHLORIDE 1 MILLIGRAM(S): 2 INJECTION INTRAMUSCULAR; INTRAVENOUS; SUBCUTANEOUS at 00:33

## 2021-11-22 NOTE — PHYSICAL THERAPY INITIAL EVALUATION ADULT - DIAGNOSIS, PT EVAL
36 y.o. male with ETOH, renal infarct, + s/p fall down stairs with resultant multiple contusion, wrist sprain

## 2021-11-22 NOTE — PHYSICAL THERAPY INITIAL EVALUATION ADULT - THERAPY FREQUENCY, PT EVAL
f/u visit while inpatient, the pt may benefit from PT for bed mobility and transfer tx if pain persists. Recommend 1 more f/u visit by PT while here at , also stair negotiation tx may be assessed.

## 2021-11-22 NOTE — PHYSICAL THERAPY INITIAL EVALUATION ADULT - CRITERIA FOR SKILLED THERAPEUTIC INTERVENTIONS
Home/impairments found/functional limitations in following categories/anticipated discharge recommendation

## 2021-11-22 NOTE — DISCHARGE NOTE NURSING/CASE MANAGEMENT/SOCIAL WORK - PATIENT PORTAL LINK FT
You can access the FollowMyHealth Patient Portal offered by [facility] by registering at the following website: [email or web address]. By joining [employer]’s FollowMyHealth portal, you will also be able to view your health information using other applications (apps) compatible with our system.

## 2021-11-22 NOTE — CONSULT NOTE ADULT - SUBJECTIVE AND OBJECTIVE BOX
36y Male presents c/o Right hand pain sp mechanical fall down stairs. He is admitted for renal lac and retroperitoneal hematoma. He also injured his right hand during the same fall. He has +TTP over the triquetral area. No other bony injury. He mainly complains of abdominal and back pain.  Xrays rad as triquetral fx. Dr Brown was consulted and we are asked to see the pt on his behalf and place a volar splint. This was done at the bedside today. Denies numbness/tingling. Right  Hand dominant.    HEALTH ISSUES - PROBLEM Dx:  Renal infarct    Fall down stairs    Elevated serum lactate dehydrogenase    Elevated LFTs    Contusion, multiple sites of trunk    Alcohol abuse          MEDICATIONS  (STANDING):  heparin   Injectable 5000 Unit(s) SubCutaneous every 8 hours  sodium chloride 0.9% lock flush 3 milliLiter(s) IV Push every 8 hours  sodium chloride 0.9%. 1000 milliLiter(s) IV Continuous <Continuous>    Allergies    No Known Allergies    Intolerances                              12.5   13.00 )-----------( 237      ( 22 Nov 2021 09:04 )             38.4     22 Nov 2021 09:04    134    |  103    |  11     ----------------------------<  110    3.9     |  25     |  1.50     Ca    8.8        22 Nov 2021 09:04  Phos  1.8       22 Nov 2021 09:04  Mg     2.1       22 Nov 2021 09:04    TPro  7.8    /  Alb  3.9    /  TBili  0.6    /  DBili  x      /  AST  267    /  ALT  155    /  AlkPhos  50     21 Nov 2021 07:45    PT/INR - ( 21 Nov 2021 07:45 )   PT: 12.4 sec;   INR: 1.07 ratio         PTT - ( 21 Nov 2021 07:45 )  PTT:27.5 sec  Vital Signs Last 24 Hrs  T(C): 37.4 (11-22-21 @ 07:56), Max: 37.4 (11-22-21 @ 07:56)  T(F): 99.3 (11-22-21 @ 07:56), Max: 99.3 (11-22-21 @ 07:56)  HR: 123 (11-22-21 @ 11:20) (90 - 136)  BP: 118/67 (11-22-21 @ 11:20) (118/67 - 155/94)  BP(mean): 92 (11-22-21 @ 07:56) (92 - 92)  RR: 18 (11-22-21 @ 11:20) (18 - 18)  SpO2: 95% (11-22-21 @ 11:20) (94% - 99%)    Imaging: Xrays Right hand and wrist demonstrates RIGHT triquetral fx that is hard to see  < from: Xray Hand 3 Views, Right (11.21.21 @ 09:15) >  Triquetral fracture. Small calcific density/fragment proximal to the medial carpal row.    < end of copied text >      Physical Exam  Gen: NAD, awake and alert clam and pleasant man  Head: + facial abraions  Neck: Intact AROM, no bony TTP.  RUE: +hand swelling and +TTP over triquetral/hypothenar area. No wrist TTP over DR. No deformity noted.  Skin intact, +TTP distal radius, no bony ttp elsewhere over the MCP, PIP, DIP of all 5 digits, compartments soft/compressive, extremity warm/well perfused. Sensation intact distal tips. Able to wiggle fingers actively, intact AIN/PIN. No elbow or shoulder bony tenderness or swelling. Full painless AROM of elbow and shoulder. 2+ radial pulse.   LUE: Moving at baseline shoulder, elbow, wrist, digits. No deformity or swelling. Painless baseline AROM shoulder, elbow, wrist.  Bilat LE: No swelling or deformity. Painless baseline APROM of hips, knees, ankles, toes. Able to actively SLR.  Negative HS/Log roll. 2+ DP pulses.     Procedure: well padded volar fiberglass splint to Right hand/wrist    A/P: 36y Male with Right Triquetral fx, closed.   NWB R UE in splint, keep c/d/I,   Elevation of hand and encouraged to wiggle digits few times per hour  Follow up with Dr. Brown within 3-5 days of DC, call office for appointment   DVT PE ppx  Above as directed by Dr Brown orthopedic Attending.

## 2021-11-22 NOTE — PHYSICAL THERAPY INITIAL EVALUATION ADULT - MODALITIES TREATMENT COMMENTS
The pt demonstrated good overall activity tolerance, responding well to therex review, transfer and ambulation tx. The pt was returned to supine and adjusted for comfort, RN aware. CB, tray and phone in place. The pt was in NAD at end of tx.

## 2021-11-22 NOTE — CONSULT NOTE ADULT - SUBJECTIVE AND OBJECTIVE BOX
c/c: left facial pain/abd pain, right hand pain    HPI: 36M, pmh of HTN self discontinued his medication, who presented to the ER after a mechanical fall. He was walking down stairs when he missed a few steps and fell tumbling down. He tried to stop the fall by reaching out with his right hand. He briefly passed out and woke up in a pool of blood. He managed to get back up. Due to continued pain he came to the ER. Imaging showed Left adrenal infarct, left renal renal infarct, RP bleed around left kidney and vascular injury. He was also noted to have right hand triquetral fracture. He is admitted to trauma. Hospitalist service consulted for medical comanagement. Pt seen and examined on 2N. was having a lot of pain in his left abdomen/left flank area. also with pain right wrist.   No dizziness/lightheadedness/blurry vision or diplopia.   no hematuria. No n/v.     ROS: all 10 systems reviewed and is as above otherwise negative.     Vital Signs Last 24 Hrs  T(C): 37.4 (2021 07:56), Max: 37.4 (2021 07:56)  T(F): 99.3 (2021 07:56), Max: 99.3 (2021 07:56)  HR: 123 (2021 11:20) (90 - 136)  BP: 118/67 (2021 11:20) (118/67 - 155/94)  BP(mean): 92 (2021 07:56) (92 - 92)  RR: 18 (2021 11:20) (18 - 18)  SpO2: 95% (2021 11:20) (94% - 99%)    PHYSICAL EXAM:    GENERAL: Comfortable, no acute distress   HEAD:  Normocephalic, Left facial swelling/abrasions related to fall.   EYES: EOMI, PERRLA  HEENT: Moist mucous membranes  NECK: Supple, No JVD  NERVOUS SYSTEM:  Alert & Oriented X3, Motor Strength 5/5 B/L upper and lower extremities  CHEST/LUNG: Clear to auscultation bilaterally  HEART: Regular rate and rhythm  ABDOMEN: Soft, Left abdominal /flank tenderness. non distended, bs+  GENITOURINARY: Voiding, no palpable bladder  EXTREMITIES:   No clubbing, cyanosis, or edema  MUSCULOSKELETAL-Right hand tenderness at fracture site.  SKIN-no rash    LABS:                        12.5   13.00 )-----------( 237      ( 2021 09:04 )             38.4         134<L>  |  103  |  11  ----------------------------<  110<H>  3.9   |  25  |  1.50<H>    Ca    8.8      2021 09:04  Phos  1.8       Mg     2.1         TPro  7.8  /  Alb  3.9  /  TBili  0.6  /  DBili  x   /  AST  267<H>  /  ALT  155<H>  /  AlkPhos  50      PT/INR - ( 2021 07:45 )   PT: 12.4 sec;   INR: 1.07 ratio         PTT - ( 2021 07:45 )  PTT:27.5 sec  Urinalysis Basic - ( 2021 05:15 )    Color: Yellow / Appearance: Clear / S.025 / pH: x  Gluc: x / Ketone: Negative  / Bili: Negative / Urobili: Negative mg/dL   Blood: x / Protein: 100 mg/dL / Nitrite: Negative   Leuk Esterase: Negative / RBC: 3-5 /HPF / WBC 3-5   Sq Epi: x / Non Sq Epi: Few / Bacteria: Few      Xray Hand 3 Views, Right (21 @ 09:15) >  Triquetral fracture. Small calcific density/fragment proximal to the medial carpal row.     CT Abdomen and Pelvis w/ IV Cont (21 @ 08:29) >  IMPRESSION:  *  Duplicated left renal arteries.  *  Vascular injury involving the left upper renal artery with small amount of adjacent retroperitoneal hematoma and infarct involving the mid and upper pole of the left kidney.  *  No evidence of active bleeding.  *  Left adrenal infarct.    CT Head No Cont (21 @ 08:19) >  FINDINGS:  There is no compelling evidence for an acute transcortical infarction. There is no evidence of mass, mass effect, midline shift or extra-axial fluid collection. The lateral ventricles and cortical sulci are age-appropriate in size and configuration. The orbits, mastoid air cells and visualized paranasal sinuses are normal. The calvarium is intact. Consider follow-up CT or MRI as clinically warranted.       CT Head No Cont (21 @ 08:19) >  There is no evidence of acute fracture to the facial bones. The mandible is intact and the mandibular condyles are well located. The ocular globes and orbital walls are intact. Mild mucosal thickening in the right maxillary sinus alveolar recess. The remaining paranasal sinuses are well developed and well-aerated. Mastoid air cells are clear. Mild left periorbital facial soft tissue swelling. Mild left premaxillary facial soft tissue swelling. Mild anterior traction soft tissue swelling.    EKG: sinus tachycardia.    MEDICATIONS  (STANDING):  heparin   Injectable 5000 Unit(s) SubCutaneous every 8 hours  sodium chloride 0.9% lock flush 3 milliLiter(s) IV Push every 8 hours    MEDICATIONS  (PRN):  acetaminophen     Tablet .. 650 milliGRAM(s) Oral every 6 hours PRN Temp greater or equal to 38C (100.4F), Mild Pain (1 - 3)  HYDROmorphone  Injectable 1 milliGRAM(s) IV Push every 6 hours PRN Severe Pain (7 - 10)  ondansetron Injectable 4 milliGRAM(s) IV Push every 6 hours PRN Nausea and/or Vomiting  oxyCODONE    IR 5 milliGRAM(s) Oral every 4 hours PRN Moderate Pain (4 - 6)    ASSESSMENT AND PLAN:   36m, PMH as above a/w:    1. Mechanical fall/Left renal A injury with Left adrenal and renal infarct, RP hematoma   -management per trauma  -would monitor h/h closely.  -nephrology consulted.   -pain control.  -incentive spirometry    2. ROBYN/Abnormal U/A:  -large amt of blood, but only minimal RBCs on urinalysis.  -check CPK, r/o rhabdo/pigment related injury  -IVF    3. Right Triquetral fracture:  -consult ortho hand.     4. Facial soft tissue injuries:  -pain control  -ice    5. H/o HTN:  -BP normal off meds here.     6. Sinus tachycardia:  -likely related to pain  -adjust pain meds for better control    7. DVT px  -hep sq, hold if h/h drops    thanks, will follow.   d/w surgery, ortho   c/c: left facial pain/abd pain, right hand pain    HPI: 36M, pmh of HTN self discontinued his medication, who presented to the ER after a mechanical fall. He was walking down stairs when he missed a few steps and fell tumbling down. He tried to stop the fall by reaching out with his right hand. He briefly passed out and woke up in a pool of blood. He managed to get back up. Due to continued pain he came to the ER. Imaging showed Left adrenal infarct, left renal renal infarct, RP bleed around left kidney and vascular injury. He was also noted to have right hand triquetral fracture. He is admitted to trauma. Hospitalist service consulted for medical comanagement. Pt seen and examined on 2N. was having a lot of pain in his left abdomen/left flank area. also with pain right wrist.   No dizziness/lightheadedness/blurry vision or diplopia.   no hematuria. No n/v.     ROS: all 10 systems reviewed and is as above otherwise negative.     PMH: as above  PSH: denies  F/H: mother-HTN  Social: tobacco-smokes occasionally, ETOH: 1pint of vodka/week  Allergies: NKDA  home meds: none    Vital Signs Last 24 Hrs  T(C): 37.4 (2021 07:56), Max: 37.4 (2021 07:56)  T(F): 99.3 (2021 07:56), Max: 99.3 (2021 07:56)  HR: 123 (2021 11:20) (90 - 136)  BP: 118/67 (2021 11:20) (118/67 - 155/94)  BP(mean): 92 (2021 07:56) (92 - 92)  RR: 18 (2021 11:20) (18 - 18)  SpO2: 95% (2021 11:20) (94% - 99%)    PHYSICAL EXAM:    GENERAL: Comfortable, no acute distress   HEAD:  Normocephalic, Left facial swelling/abrasions related to fall.   EYES: EOMI, PERRLA  HEENT: Moist mucous membranes  NECK: Supple, No JVD  NERVOUS SYSTEM:  Alert & Oriented X3, Motor Strength 5/5 B/L upper and lower extremities  CHEST/LUNG: Clear to auscultation bilaterally  HEART: Regular rate and rhythm  ABDOMEN: Soft, Left abdominal /flank tenderness. non distended, bs+  GENITOURINARY: Voiding, no palpable bladder  EXTREMITIES:   No clubbing, cyanosis, or edema  MUSCULOSKELETAL-Right hand tenderness at fracture site.  SKIN-no rash    LABS:                        12.5   13.00 )-----------( 237      ( 2021 09:04 )             38.4         134<L>  |  103  |  11  ----------------------------<  110<H>  3.9   |  25  |  1.50<H>    Ca    8.8      2021 09:04  Phos  1.8       Mg     2.1         TPro  7.8  /  Alb  3.9  /  TBili  0.6  /  DBili  x   /  AST  267<H>  /  ALT  155<H>  /  AlkPhos  50      PT/INR - ( 2021 07:45 )   PT: 12.4 sec;   INR: 1.07 ratio         PTT - ( 2021 07:45 )  PTT:27.5 sec  Urinalysis Basic - ( 2021 05:15 )    Color: Yellow / Appearance: Clear / S.025 / pH: x  Gluc: x / Ketone: Negative  / Bili: Negative / Urobili: Negative mg/dL   Blood: x / Protein: 100 mg/dL / Nitrite: Negative   Leuk Esterase: Negative / RBC: 3-5 /HPF / WBC 3-5   Sq Epi: x / Non Sq Epi: Few / Bacteria: Few      Xray Hand 3 Views, Right (21 @ 09:15) >  Triquetral fracture. Small calcific density/fragment proximal to the medial carpal row.     CT Abdomen and Pelvis w/ IV Cont (21 @ 08:29) >  IMPRESSION:  *  Duplicated left renal arteries.  *  Vascular injury involving the left upper renal artery with small amount of adjacent retroperitoneal hematoma and infarct involving the mid and upper pole of the left kidney.  *  No evidence of active bleeding.  *  Left adrenal infarct.    CT Head No Cont (21 @ 08:19) >  FINDINGS:  There is no compelling evidence for an acute transcortical infarction. There is no evidence of mass, mass effect, midline shift or extra-axial fluid collection. The lateral ventricles and cortical sulci are age-appropriate in size and configuration. The orbits, mastoid air cells and visualized paranasal sinuses are normal. The calvarium is intact. Consider follow-up CT or MRI as clinically warranted.       CT Head No Cont (21 @ 08:19) >  There is no evidence of acute fracture to the facial bones. The mandible is intact and the mandibular condyles are well located. The ocular globes and orbital walls are intact. Mild mucosal thickening in the right maxillary sinus alveolar recess. The remaining paranasal sinuses are well developed and well-aerated. Mastoid air cells are clear. Mild left periorbital facial soft tissue swelling. Mild left premaxillary facial soft tissue swelling. Mild anterior traction soft tissue swelling.    EKG: sinus tachycardia.    MEDICATIONS  (STANDING):  heparin   Injectable 5000 Unit(s) SubCutaneous every 8 hours  sodium chloride 0.9% lock flush 3 milliLiter(s) IV Push every 8 hours    MEDICATIONS  (PRN):  acetaminophen     Tablet .. 650 milliGRAM(s) Oral every 6 hours PRN Temp greater or equal to 38C (100.4F), Mild Pain (1 - 3)  HYDROmorphone  Injectable 1 milliGRAM(s) IV Push every 6 hours PRN Severe Pain (7 - 10)  ondansetron Injectable 4 milliGRAM(s) IV Push every 6 hours PRN Nausea and/or Vomiting  oxyCODONE    IR 5 milliGRAM(s) Oral every 4 hours PRN Moderate Pain (4 - 6)    ASSESSMENT AND PLAN:   36m, PMH as above a/w:    1. Mechanical fall/Left renal A injury with Left adrenal and renal infarct, RP hematoma   -management per trauma  -would monitor h/h closely.  -nephrology consulted.   -pain control.  -incentive spirometry    2. ROBYN/Abnormal U/A:  -large amt of blood, but only minimal RBCs on urinalysis.  -check CPK, r/o rhabdo/pigment related injury  -IVF    3. Right Triquetral fracture:  -consult ortho hand.     4. Facial soft tissue injuries:  -pain control  -ice    5. H/o HTN:  -BP normal off meds here.     6. Sinus tachycardia:  -likely related to pain  -adjust pain meds for better control    7. DVT px  -hep sq, hold if h/h drops    thanks, will follow.   d/w surgery, ortho

## 2021-11-23 LAB
ANION GAP SERPL CALC-SCNC: 7 MMOL/L — SIGNIFICANT CHANGE UP (ref 5–17)
BUN SERPL-MCNC: 9 MG/DL — SIGNIFICANT CHANGE UP (ref 7–23)
CALCIUM SERPL-MCNC: 8.5 MG/DL — SIGNIFICANT CHANGE UP (ref 8.5–10.1)
CHLORIDE SERPL-SCNC: 101 MMOL/L — SIGNIFICANT CHANGE UP (ref 96–108)
CO2 SERPL-SCNC: 25 MMOL/L — SIGNIFICANT CHANGE UP (ref 22–31)
COVID-19 NUCLEOCAPSID GAM AB INTERP: NEGATIVE — SIGNIFICANT CHANGE UP
COVID-19 NUCLEOCAPSID TOTAL GAM ANTIBODY RESULT: 0.07 INDEX — SIGNIFICANT CHANGE UP
CREAT SERPL-MCNC: 1.32 MG/DL — HIGH (ref 0.5–1.3)
GLUCOSE SERPL-MCNC: 107 MG/DL — HIGH (ref 70–99)
HCT VFR BLD CALC: 33.5 % — LOW (ref 39–50)
HCT VFR BLD CALC: 34.7 % — LOW (ref 39–50)
HCT VFR BLD CALC: 35.7 % — LOW (ref 39–50)
HCT VFR BLD CALC: 35.8 % — LOW (ref 39–50)
HGB BLD-MCNC: 10.9 G/DL — LOW (ref 13–17)
HGB BLD-MCNC: 11.1 G/DL — LOW (ref 13–17)
HGB BLD-MCNC: 11.5 G/DL — LOW (ref 13–17)
HGB BLD-MCNC: 11.5 G/DL — LOW (ref 13–17)
MAGNESIUM SERPL-MCNC: 2.1 MG/DL — SIGNIFICANT CHANGE UP (ref 1.6–2.6)
PHOSPHATE SERPL-MCNC: 2.1 MG/DL — LOW (ref 2.5–4.5)
POTASSIUM SERPL-MCNC: 3.7 MMOL/L — SIGNIFICANT CHANGE UP (ref 3.5–5.3)
POTASSIUM SERPL-SCNC: 3.7 MMOL/L — SIGNIFICANT CHANGE UP (ref 3.5–5.3)
SARS-COV-2 IGG+IGM SERPL QL IA: 0.07 INDEX — SIGNIFICANT CHANGE UP
SARS-COV-2 IGG+IGM SERPL QL IA: NEGATIVE — SIGNIFICANT CHANGE UP
SODIUM SERPL-SCNC: 133 MMOL/L — LOW (ref 135–145)

## 2021-11-23 PROCEDURE — 99232 SBSQ HOSP IP/OBS MODERATE 35: CPT

## 2021-11-23 PROCEDURE — 99231 SBSQ HOSP IP/OBS SF/LOW 25: CPT

## 2021-11-23 RX ORDER — OXYCODONE HYDROCHLORIDE 5 MG/1
10 TABLET ORAL EVERY 4 HOURS
Refills: 0 | Status: DISCONTINUED | OUTPATIENT
Start: 2021-11-23 | End: 2021-11-26

## 2021-11-23 RX ORDER — HYDROMORPHONE HYDROCHLORIDE 2 MG/ML
1 INJECTION INTRAMUSCULAR; INTRAVENOUS; SUBCUTANEOUS ONCE
Refills: 0 | Status: DISCONTINUED | OUTPATIENT
Start: 2021-11-23 | End: 2021-11-23

## 2021-11-23 RX ORDER — DEXTROSE MONOHYDRATE, SODIUM CHLORIDE, AND POTASSIUM CHLORIDE 50; .745; 4.5 G/1000ML; G/1000ML; G/1000ML
1000 INJECTION, SOLUTION INTRAVENOUS
Refills: 0 | Status: DISCONTINUED | OUTPATIENT
Start: 2021-11-23 | End: 2021-11-24

## 2021-11-23 RX ORDER — OXYCODONE HYDROCHLORIDE 5 MG/1
10 TABLET ORAL ONCE
Refills: 0 | Status: DISCONTINUED | OUTPATIENT
Start: 2021-11-23 | End: 2021-11-23

## 2021-11-23 RX ADMIN — HYDROMORPHONE HYDROCHLORIDE 1 MILLIGRAM(S): 2 INJECTION INTRAMUSCULAR; INTRAVENOUS; SUBCUTANEOUS at 14:42

## 2021-11-23 RX ADMIN — Medication 650 MILLIGRAM(S): at 20:36

## 2021-11-23 RX ADMIN — SODIUM CHLORIDE 3 MILLILITER(S): 9 INJECTION INTRAMUSCULAR; INTRAVENOUS; SUBCUTANEOUS at 13:40

## 2021-11-23 RX ADMIN — HYDROMORPHONE HYDROCHLORIDE 1 MILLIGRAM(S): 2 INJECTION INTRAMUSCULAR; INTRAVENOUS; SUBCUTANEOUS at 22:25

## 2021-11-23 RX ADMIN — OXYCODONE HYDROCHLORIDE 10 MILLIGRAM(S): 5 TABLET ORAL at 16:54

## 2021-11-23 RX ADMIN — OXYCODONE HYDROCHLORIDE 10 MILLIGRAM(S): 5 TABLET ORAL at 12:45

## 2021-11-23 RX ADMIN — SODIUM CHLORIDE 3 MILLILITER(S): 9 INJECTION INTRAMUSCULAR; INTRAVENOUS; SUBCUTANEOUS at 20:37

## 2021-11-23 RX ADMIN — OXYCODONE HYDROCHLORIDE 10 MILLIGRAM(S): 5 TABLET ORAL at 17:30

## 2021-11-23 RX ADMIN — Medication 62.5 MILLIMOLE(S): at 10:24

## 2021-11-23 RX ADMIN — Medication 650 MILLIGRAM(S): at 21:36

## 2021-11-23 RX ADMIN — OXYCODONE HYDROCHLORIDE 10 MILLIGRAM(S): 5 TABLET ORAL at 21:06

## 2021-11-23 RX ADMIN — HEPARIN SODIUM 5000 UNIT(S): 5000 INJECTION INTRAVENOUS; SUBCUTANEOUS at 20:36

## 2021-11-23 RX ADMIN — Medication 650 MILLIGRAM(S): at 10:41

## 2021-11-23 RX ADMIN — HYDROMORPHONE HYDROCHLORIDE 1 MILLIGRAM(S): 2 INJECTION INTRAMUSCULAR; INTRAVENOUS; SUBCUTANEOUS at 15:15

## 2021-11-23 RX ADMIN — HYDROMORPHONE HYDROCHLORIDE 1 MILLIGRAM(S): 2 INJECTION INTRAMUSCULAR; INTRAVENOUS; SUBCUTANEOUS at 02:16

## 2021-11-23 RX ADMIN — DEXTROSE MONOHYDRATE, SODIUM CHLORIDE, AND POTASSIUM CHLORIDE 120 MILLILITER(S): 50; .745; 4.5 INJECTION, SOLUTION INTRAVENOUS at 21:18

## 2021-11-23 RX ADMIN — OXYCODONE HYDROCHLORIDE 10 MILLIGRAM(S): 5 TABLET ORAL at 20:36

## 2021-11-23 RX ADMIN — HYDROMORPHONE HYDROCHLORIDE 1 MILLIGRAM(S): 2 INJECTION INTRAMUSCULAR; INTRAVENOUS; SUBCUTANEOUS at 02:01

## 2021-11-23 RX ADMIN — HEPARIN SODIUM 5000 UNIT(S): 5000 INJECTION INTRAVENOUS; SUBCUTANEOUS at 06:34

## 2021-11-23 RX ADMIN — POLYETHYLENE GLYCOL 3350 17 GRAM(S): 17 POWDER, FOR SOLUTION ORAL at 09:49

## 2021-11-23 RX ADMIN — HYDROMORPHONE HYDROCHLORIDE 1 MILLIGRAM(S): 2 INJECTION INTRAMUSCULAR; INTRAVENOUS; SUBCUTANEOUS at 11:00

## 2021-11-23 RX ADMIN — OXYCODONE HYDROCHLORIDE 10 MILLIGRAM(S): 5 TABLET ORAL at 13:15

## 2021-11-23 RX ADMIN — HYDROMORPHONE HYDROCHLORIDE 1 MILLIGRAM(S): 2 INJECTION INTRAMUSCULAR; INTRAVENOUS; SUBCUTANEOUS at 23:22

## 2021-11-23 RX ADMIN — HYDROMORPHONE HYDROCHLORIDE 1 MILLIGRAM(S): 2 INJECTION INTRAMUSCULAR; INTRAVENOUS; SUBCUTANEOUS at 18:24

## 2021-11-23 RX ADMIN — HYDROMORPHONE HYDROCHLORIDE 1 MILLIGRAM(S): 2 INJECTION INTRAMUSCULAR; INTRAVENOUS; SUBCUTANEOUS at 06:35

## 2021-11-23 RX ADMIN — HYDROMORPHONE HYDROCHLORIDE 1 MILLIGRAM(S): 2 INJECTION INTRAMUSCULAR; INTRAVENOUS; SUBCUTANEOUS at 11:15

## 2021-11-23 RX ADMIN — HEPARIN SODIUM 5000 UNIT(S): 5000 INJECTION INTRAVENOUS; SUBCUTANEOUS at 13:40

## 2021-11-23 RX ADMIN — HYDROMORPHONE HYDROCHLORIDE 1 MILLIGRAM(S): 2 INJECTION INTRAMUSCULAR; INTRAVENOUS; SUBCUTANEOUS at 06:50

## 2021-11-23 RX ADMIN — HYDROMORPHONE HYDROCHLORIDE 1 MILLIGRAM(S): 2 INJECTION INTRAMUSCULAR; INTRAVENOUS; SUBCUTANEOUS at 23:37

## 2021-11-23 RX ADMIN — HYDROMORPHONE HYDROCHLORIDE 1 MILLIGRAM(S): 2 INJECTION INTRAMUSCULAR; INTRAVENOUS; SUBCUTANEOUS at 18:45

## 2021-11-23 RX ADMIN — SODIUM CHLORIDE 3 MILLILITER(S): 9 INJECTION INTRAMUSCULAR; INTRAVENOUS; SUBCUTANEOUS at 06:37

## 2021-11-23 RX ADMIN — HYDROMORPHONE HYDROCHLORIDE 1 MILLIGRAM(S): 2 INJECTION INTRAMUSCULAR; INTRAVENOUS; SUBCUTANEOUS at 22:40

## 2021-11-23 NOTE — CONSULT NOTE ADULT - SUBJECTIVE AND OBJECTIVE BOX
Chief Complaint:  Patient is a 36y old  Male who presents with a chief complaint of Renal infarction, trauma    HPI:  35 yo M with left renal laceration 2/2 fall down stairs. In ED, pt reported LOC, pain. Denied NVD. IR consulted for evaluation.     Allergies  No Known Allergies    MEDICATIONS  (STANDING):  dextrose 5% + sodium chloride 0.45% with potassium chloride 20 mEq/L 1000 milliLiter(s) (120 mL/Hr) IV Continuous <Continuous>  heparin   Injectable 5000 Unit(s) SubCutaneous every 8 hours  polyethylene glycol 3350 17 Gram(s) Oral daily  senna 2 Tablet(s) Oral at bedtime  sodium chloride 0.9% lock flush 3 milliLiter(s) IV Push every 8 hours    MEDICATIONS  (PRN):  acetaminophen     Tablet .. 650 milliGRAM(s) Oral every 6 hours PRN Temp greater or equal to 38C (100.4F), Mild Pain (1 - 3)  HYDROmorphone  Injectable 1 milliGRAM(s) IV Push every 4 hours PRN Severe Pain (7 - 10)  ondansetron Injectable 4 milliGRAM(s) IV Push every 6 hours PRN Nausea and/or Vomiting  oxyCODONE    IR 10 milliGRAM(s) Oral every 4 hours PRN Moderate Pain (4 - 6)  simethicone 80 milliGRAM(s) Chew every 6 hours PRN Gas      PAST MEDICAL & SURGICAL HISTORY:  No pertinent past medical history  No significant past surgical history        Vital Signs Last 24 Hrs  T(C): 37.7 (23 Nov 2021 12:45), Max: 38.4 (22 Nov 2021 20:32)  T(F): 99.9 (23 Nov 2021 12:45), Max: 101.2 (22 Nov 2021 20:32)  HR: 106 (23 Nov 2021 10:40) (101 - 119)  BP: 137/91 (23 Nov 2021 10:40) (109/67 - 144/88)  BP(mean): 102 (23 Nov 2021 08:41) (102 - 102)  RR: 18 (23 Nov 2021 10:40) (16 - 18)  SpO2: 97% (23 Nov 2021 10:40) (94% - 97%)      CBC                        11.5   x     )-----------( x        ( 23 Nov 2021 08:21 )             35.7       Chemistry  11-22    134<L>  |  103  |  11  ----------------------------<  110<H>  3.9   |  25  |  1.50<H>    Ca    8.8      22 Nov 2021 09:04  Phos  1.8     11-22  Mg     2.1     11-22      < from: CT Abdomen and Pelvis w/ IV Cont (11.21.21 @ 08:29) >  IMPRESSION:    *  Duplicated left renal arteries.  *  Vascular injury involving the left upper renal artery with small amount of adjacent retroperitoneal hematoma and infarct involving the mid and upper pole of the left kidney.  *  No evidence of active bleeding.  *  Left adrenal infarct.    < end of copied text >

## 2021-11-23 NOTE — CONSULT NOTE ADULT - ATTENDING COMMENTS
35yo M with renal laceration referred to IR for evaluation  - Case reviewed/imaging reviewed   - VSS, H/H stable, no active extravasation on imaging that would require embolization  - IR recommendation is to continue monitoring and medical management. Surgical resident/PA aware  - If patient's clinical status changes, please re consult IR

## 2021-11-24 DIAGNOSIS — N28.0 ISCHEMIA AND INFARCTION OF KIDNEY: ICD-10-CM

## 2021-11-24 LAB
ADD ON TEST-SPECIMEN IN LAB: SIGNIFICANT CHANGE UP
ANION GAP SERPL CALC-SCNC: 6 MMOL/L — SIGNIFICANT CHANGE UP (ref 5–17)
BUN SERPL-MCNC: 6 MG/DL — LOW (ref 7–23)
CALCIUM SERPL-MCNC: 8.4 MG/DL — LOW (ref 8.5–10.1)
CHLORIDE SERPL-SCNC: 98 MMOL/L — SIGNIFICANT CHANGE UP (ref 96–108)
CO2 SERPL-SCNC: 27 MMOL/L — SIGNIFICANT CHANGE UP (ref 22–31)
CREAT SERPL-MCNC: 1.2 MG/DL — SIGNIFICANT CHANGE UP (ref 0.5–1.3)
GLUCOSE SERPL-MCNC: 115 MG/DL — HIGH (ref 70–99)
HCT VFR BLD CALC: 31.6 % — LOW (ref 39–50)
HCT VFR BLD CALC: 34.1 % — LOW (ref 39–50)
HGB BLD-MCNC: 10.4 G/DL — LOW (ref 13–17)
HGB BLD-MCNC: 11.1 G/DL — LOW (ref 13–17)
MCHC RBC-ENTMCNC: 30.7 PG — SIGNIFICANT CHANGE UP (ref 27–34)
MCHC RBC-ENTMCNC: 32.9 GM/DL — SIGNIFICANT CHANGE UP (ref 32–36)
MCV RBC AUTO: 93.2 FL — SIGNIFICANT CHANGE UP (ref 80–100)
PLATELET # BLD AUTO: 231 K/UL — SIGNIFICANT CHANGE UP (ref 150–400)
POTASSIUM SERPL-MCNC: 3.5 MMOL/L — SIGNIFICANT CHANGE UP (ref 3.5–5.3)
POTASSIUM SERPL-SCNC: 3.5 MMOL/L — SIGNIFICANT CHANGE UP (ref 3.5–5.3)
RBC # BLD: 3.39 M/UL — LOW (ref 4.2–5.8)
RBC # FLD: 14.3 % — SIGNIFICANT CHANGE UP (ref 10.3–14.5)
SODIUM SERPL-SCNC: 131 MMOL/L — LOW (ref 135–145)
WBC # BLD: 13.61 K/UL — HIGH (ref 3.8–10.5)
WBC # FLD AUTO: 13.61 K/UL — HIGH (ref 3.8–10.5)

## 2021-11-24 PROCEDURE — 74177 CT ABD & PELVIS W/CONTRAST: CPT | Mod: 26

## 2021-11-24 PROCEDURE — 99231 SBSQ HOSP IP/OBS SF/LOW 25: CPT

## 2021-11-24 PROCEDURE — 99232 SBSQ HOSP IP/OBS MODERATE 35: CPT

## 2021-11-24 PROCEDURE — 99252 IP/OBS CONSLTJ NEW/EST SF 35: CPT

## 2021-11-24 RX ORDER — HYDROMORPHONE HYDROCHLORIDE 2 MG/ML
1 INJECTION INTRAMUSCULAR; INTRAVENOUS; SUBCUTANEOUS ONCE
Refills: 0 | Status: DISCONTINUED | OUTPATIENT
Start: 2021-11-24 | End: 2021-11-24

## 2021-11-24 RX ADMIN — POLYETHYLENE GLYCOL 3350 17 GRAM(S): 17 POWDER, FOR SOLUTION ORAL at 09:39

## 2021-11-24 RX ADMIN — OXYCODONE HYDROCHLORIDE 10 MILLIGRAM(S): 5 TABLET ORAL at 00:32

## 2021-11-24 RX ADMIN — HEPARIN SODIUM 5000 UNIT(S): 5000 INJECTION INTRAVENOUS; SUBCUTANEOUS at 06:20

## 2021-11-24 RX ADMIN — HEPARIN SODIUM 5000 UNIT(S): 5000 INJECTION INTRAVENOUS; SUBCUTANEOUS at 20:56

## 2021-11-24 RX ADMIN — HYDROMORPHONE HYDROCHLORIDE 1 MILLIGRAM(S): 2 INJECTION INTRAMUSCULAR; INTRAVENOUS; SUBCUTANEOUS at 14:50

## 2021-11-24 RX ADMIN — HYDROMORPHONE HYDROCHLORIDE 1 MILLIGRAM(S): 2 INJECTION INTRAMUSCULAR; INTRAVENOUS; SUBCUTANEOUS at 04:50

## 2021-11-24 RX ADMIN — HEPARIN SODIUM 5000 UNIT(S): 5000 INJECTION INTRAVENOUS; SUBCUTANEOUS at 14:49

## 2021-11-24 RX ADMIN — HYDROMORPHONE HYDROCHLORIDE 1 MILLIGRAM(S): 2 INJECTION INTRAMUSCULAR; INTRAVENOUS; SUBCUTANEOUS at 03:15

## 2021-11-24 RX ADMIN — OXYCODONE HYDROCHLORIDE 10 MILLIGRAM(S): 5 TABLET ORAL at 20:56

## 2021-11-24 RX ADMIN — HYDROMORPHONE HYDROCHLORIDE 1 MILLIGRAM(S): 2 INJECTION INTRAMUSCULAR; INTRAVENOUS; SUBCUTANEOUS at 09:39

## 2021-11-24 RX ADMIN — OXYCODONE HYDROCHLORIDE 10 MILLIGRAM(S): 5 TABLET ORAL at 21:26

## 2021-11-24 RX ADMIN — SODIUM CHLORIDE 3 MILLILITER(S): 9 INJECTION INTRAMUSCULAR; INTRAVENOUS; SUBCUTANEOUS at 06:19

## 2021-11-24 RX ADMIN — HYDROMORPHONE HYDROCHLORIDE 1 MILLIGRAM(S): 2 INJECTION INTRAMUSCULAR; INTRAVENOUS; SUBCUTANEOUS at 18:53

## 2021-11-24 RX ADMIN — HYDROMORPHONE HYDROCHLORIDE 1 MILLIGRAM(S): 2 INJECTION INTRAMUSCULAR; INTRAVENOUS; SUBCUTANEOUS at 05:20

## 2021-11-24 RX ADMIN — OXYCODONE HYDROCHLORIDE 10 MILLIGRAM(S): 5 TABLET ORAL at 01:02

## 2021-11-24 RX ADMIN — SENNA PLUS 2 TABLET(S): 8.6 TABLET ORAL at 20:56

## 2021-11-24 RX ADMIN — HYDROMORPHONE HYDROCHLORIDE 1 MILLIGRAM(S): 2 INJECTION INTRAMUSCULAR; INTRAVENOUS; SUBCUTANEOUS at 23:23

## 2021-11-24 RX ADMIN — SODIUM CHLORIDE 3 MILLILITER(S): 9 INJECTION INTRAMUSCULAR; INTRAVENOUS; SUBCUTANEOUS at 20:57

## 2021-11-24 RX ADMIN — HYDROMORPHONE HYDROCHLORIDE 1 MILLIGRAM(S): 2 INJECTION INTRAMUSCULAR; INTRAVENOUS; SUBCUTANEOUS at 22:53

## 2021-11-24 RX ADMIN — HYDROMORPHONE HYDROCHLORIDE 1 MILLIGRAM(S): 2 INJECTION INTRAMUSCULAR; INTRAVENOUS; SUBCUTANEOUS at 03:45

## 2021-11-24 RX ADMIN — SODIUM CHLORIDE 3 MILLILITER(S): 9 INJECTION INTRAMUSCULAR; INTRAVENOUS; SUBCUTANEOUS at 12:24

## 2021-11-24 NOTE — CONSULT NOTE ADULT - SUBJECTIVE AND OBJECTIVE BOX
Chief complaint:  Patient is a 36y old  Male who presents with a chief complaint of Renal infarction    HPI:  35 yo pt presents to ED after fall ~7 steps.  Pt states he fell going down stairs last night at ~9pm. + loc.  + etoh.  Pt now with increasing pain to lower abd. Pt found ot have renal laceration/infarct. IR consulted today and yesterday as well for possible intervention options.     Allergies  No Known Allergies    MEDICATIONS  (STANDING):  heparin   Injectable 5000 Unit(s) SubCutaneous every 8 hours  polyethylene glycol 3350 17 Gram(s) Oral daily  senna 2 Tablet(s) Oral at bedtime  sodium chloride 0.9% lock flush 3 milliLiter(s) IV Push every 8 hours    MEDICATIONS  (PRN):  acetaminophen     Tablet .. 650 milliGRAM(s) Oral every 6 hours PRN Temp greater or equal to 38C (100.4F), Mild Pain (1 - 3)  HYDROmorphone  Injectable 1 milliGRAM(s) IV Push every 4 hours PRN Severe Pain (7 - 10)  ondansetron Injectable 4 milliGRAM(s) IV Push every 6 hours PRN Nausea and/or Vomiting  oxyCODONE    IR 10 milliGRAM(s) Oral every 4 hours PRN Moderate Pain (4 - 6)  simethicone 80 milliGRAM(s) Chew every 6 hours PRN Gas      PAST MEDICAL & SURGICAL HISTORY:  No pertinent past medical history    No significant past surgical history      Vital Signs Last 24 Hrs  T(C): 36.9 (24 Nov 2021 08:30), Max: 37.9 (23 Nov 2021 19:55)  T(F): 98.4 (24 Nov 2021 08:30), Max: 100.3 (23 Nov 2021 19:55)  HR: 95 (24 Nov 2021 08:30) (95 - 115)  BP: 130/86 (24 Nov 2021 08:30) (128/83 - 151/88)  BP(mean): 97 (24 Nov 2021 08:30) (97 - 97)  RR: 16 (24 Nov 2021 08:30) (16 - 18)  SpO2: 97% (24 Nov 2021 08:30) (95% - 97%)    CBC                        10.4   13.61 )-----------( 231      ( 24 Nov 2021 06:58 )             31.6       Chemistry  11-24    131<L>  |  98  |  6<L>  ----------------------------<  115<H>  3.5   |  27  |  1.20    Ca    8.4<L>      24 Nov 2021 06:58  Phos  2.1     11-23  Mg     2.1     11-23

## 2021-11-24 NOTE — CONSULT NOTE ADULT - ASSESSMENT
37yo M w left renal infarct 2/2 renal lac 2/2 fall down stairs, referred to IR for evaluation  - Case and imaging reviewed with Dr. Gonzalez. No role for embolectomy or possibility of recannulation in this case. Dr. Fraga aware. 
37yo M with renal laceration referred to IR for evaluation  - Case reviewed/imaging reviewed with Dr. Sykes  - VSS, H/H stable, no active extravasation on imaging that would require embolization  - IR recommendation is to continue monitoring and medical management. Surgical resident/PA aware
36M, pmh of HTN with ETOH use who presented to the ER after a mechanical fall, renal evaluation of ROBYN.    ROBYN  -renal function was stabilizing, volume ok  -Ivf if rise in function  -Monitor I/O's  -NSAID avoidance  -Contrast may have delayed recovery    Renal infarct  -Surgery/urology following  -Consdiertion of vascular    D/c with staff, Dr Pugh    Will only follow as needed, call with any issues or questoins  Seen earlier, note now with computer errors

## 2021-11-24 NOTE — CONSULT NOTE ADULT - PROBLEM SELECTOR RECOMMENDATION 9
This  should heal spontaneously and patient should be monitored for HTN.  Should avoid strenuous activities for 3 months.  Will ask for IR consult in case they have ideas for management

## 2021-11-24 NOTE — PROVIDER CONTACT NOTE (OTHER) - SITUATION
called hospitalist service and gave them patient's name.
left message with Dr. Rawls office staff galindo consult.
Pt completely removed acesplint to right wrist. States that it was itching him and his hand was numb.

## 2021-11-24 NOTE — CONSULT NOTE ADULT - CONSULT REASON
ROBYN
Right hand pain triquetral fx
med comanagement.
37yo M w left renal infarct 2/2 renal lac 2/2 fall down stairs, referred to IR for evaluation
37yo M with renal laceration referred to IR for evaluation
Renal infarct

## 2021-11-24 NOTE — CONSULT NOTE ADULT - SUBJECTIVE AND OBJECTIVE BOX
CHIEF COMPLAINT:left upper pole infarct    HISTORY OF PRESENT ILLNESS:Trauma, creatinine improving    PAST MEDICAL & SURGICAL HISTORY:  No pertinent past medical history    No significant past surgical history        REVIEW OF SYSTEMS:    CONSTITUTIONAL: No weakness, fevers or chills  EYES/ENT: No visual changes;  No vertigo or throat pain   NECK: No pain or stiffness  RESPIRATORY: No cough, wheezing, hemoptysis; No shortness of breath  CARDIOVASCULAR: No chest pain or palpitations  GASTROINTESTINAL: No abdominal or epigastric pain. No nausea, vomiting, or hematemesis; No diarrhea or constipation. No melena or hematochezia.  GENITOURINARY: No dysuria, frequency or hematuria  NEUROLOGICAL: No numbness or weakness  SKIN: No itching, burning, rashes, or lesions   All other review of systems is negative unless indicated above.    MEDICATIONS  (STANDING):  heparin   Injectable 5000 Unit(s) SubCutaneous every 8 hours  polyethylene glycol 3350 17 Gram(s) Oral daily  senna 2 Tablet(s) Oral at bedtime  sodium chloride 0.9% lock flush 3 milliLiter(s) IV Push every 8 hours    MEDICATIONS  (PRN):  acetaminophen     Tablet .. 650 milliGRAM(s) Oral every 6 hours PRN Temp greater or equal to 38C (100.4F), Mild Pain (1 - 3)  HYDROmorphone  Injectable 1 milliGRAM(s) IV Push every 4 hours PRN Severe Pain (7 - 10)  ondansetron Injectable 4 milliGRAM(s) IV Push every 6 hours PRN Nausea and/or Vomiting  oxyCODONE    IR 10 milliGRAM(s) Oral every 4 hours PRN Moderate Pain (4 - 6)  simethicone 80 milliGRAM(s) Chew every 6 hours PRN Gas      Allergies    No Known Allergies    Intolerances        SOCIAL HISTORY:    FAMILY HISTORY:      Vital Signs Last 24 Hrs  T(C): 36.9 (24 Nov 2021 08:30), Max: 38.3 (23 Nov 2021 10:40)  T(F): 98.4 (24 Nov 2021 08:30), Max: 100.9 (23 Nov 2021 10:40)  HR: 95 (24 Nov 2021 08:30) (95 - 115)  BP: 130/86 (24 Nov 2021 08:30) (128/83 - 151/88)  BP(mean): 97 (24 Nov 2021 08:30) (97 - 97)  RR: 16 (24 Nov 2021 08:30) (16 - 18)  SpO2: 97% (24 Nov 2021 08:30) (95% - 97%)    PHYSICAL EXAM:    Constitutional: NAD, well-developed  HEENT: LORENZO, EOMI, Normal Hearing, MMM  Neck: No LAD, No JVD  Back: Normal spine flexure, No CVA tenderness  Respiratory: CTAB   Cardiovascular: S1 and S2, RRR, no M/G/R  Abd: BS+, soft, NT/ND, No CVAT  : Normal phallus,open meatus,bilateral descended testes, no masses  DIANE: Normal prostate, no masses  Extremities: No peripheral edema  Vascular: 2+ peripheral pulses  Neurological: A/O x 3, no focal deficits  Psychiatric: Normal mood, normal affect  Musculoskeletal: 5/5 strength b/l upper and lower extremities  Skin: No rashes    LABS:                        10.4   13.61 )-----------( 231      ( 24 Nov 2021 06:58 )             31.6     11-24    131<L>  |  98  |  6<L>  ----------------------------<  115<H>  3.5   |  27  |  1.20    Ca    8.4<L>      24 Nov 2021 06:58  Phos  2.1     11-23  Mg     2.1     11-23          Urine Culture:     RADIOLOGY & ADDITIONAL STUDIES:

## 2021-11-24 NOTE — CONSULT NOTE ADULT - SUBJECTIVE AND OBJECTIVE BOX
36M, pmh of HTN with ETOH use who presented to the ER after a mechanical fall, renal evaluation of ROBYN. He was walking down stairs when he missed a few steps and fell tumbling down. Presented to ED and Imaging showed Left adrenal infarct, left renal renal infarct, RP bleed around left kidney and vascular injury. Denies hematuria, reports good uop.       PAST MEDICAL & SURGICAL HISTORY:  No pertinent past medical history    No significant past surgical history        MEDICATIONS  (STANDING):  heparin   Injectable 5000 Unit(s) SubCutaneous every 8 hours  polyethylene glycol 3350 17 Gram(s) Oral daily  senna 2 Tablet(s) Oral at bedtime  sodium chloride 0.9% lock flush 3 milliLiter(s) IV Push every 8 hours    MEDICATIONS  (PRN):  acetaminophen     Tablet .. 650 milliGRAM(s) Oral every 6 hours PRN Temp greater or equal to 38C (100.4F), Mild Pain (1 - 3)  HYDROmorphone  Injectable 1 milliGRAM(s) IV Push every 4 hours PRN Severe Pain (7 - 10)  ondansetron Injectable 4 milliGRAM(s) IV Push every 6 hours PRN Nausea and/or Vomiting  oxyCODONE    IR 10 milliGRAM(s) Oral every 4 hours PRN Moderate Pain (4 - 6)  simethicone 80 milliGRAM(s) Chew every 6 hours PRN Gas      Allergies    No Known Allergies    Intolerances        SOCIAL HISTORY:    FAMILY HISTORY:      REVIEW OF SYSTEMS:    CONSTITUTIONAL: No weakness, fevers or chills  EYES/ENT: No visual changes;  No vertigo or throat pain   NECK: No pain or stiffness  RESPIRATORY: No cough, wheezing, hemoptysis; No shortness of breath  CARDIOVASCULAR: No chest pain or palpitations  GASTROINTESTINAL: No abdominal or epigastric pain. No nausea, vomiting, or hematemesis; No diarrhea or constipation. No melena or hematochezia.  GENITOURINARY: No dysuria, frequency or hematuria  NEUROLOGICAL: No numbness or weakness  SKIN: No itching, burning, rashes, or lesions   All other review of systems is negative unless indicated above.      T(C): , Max: 36.9 (11-24-21 @ 08:30)  T(F): , Max: 98.4 (11-24-21 @ 08:30)  HR: 100 (11-25-21 @ 00:10)  BP: 154/99 (11-25-21 @ 00:10)  BP(mean): 97 (11-24-21 @ 08:30)  RR: 16 (11-25-21 @ 00:10)  SpO2: 95% (11-25-21 @ 00:10)  Wt(kg): --        PHYSICAL EXAM:    Constitutional: NAD, well-groomed, well-developed, ambulting in bathroom  HEENT: PERRLA, EOMI,  MMM  Neck: No LAD, No JVD  Respiratory: CTAB  Cardiovascular: S1 and S2, RRR  Gastrointestinal: flank tenderness  Extremities: No peripheral edema  Neurological: A/O x 3, no focal deficits  Psychiatric: Normal mood, normal affect  : No Jeong  Skin: No rashes  Access: Not applicable        LABS:                        10.4   13.61 )-----------( 231      ( 24 Nov 2021 06:58 )             31.6     24 Nov 2021 06:58    131    |  98     |  6      ----------------------------<  115    3.5     |  27     |  1.20   23 Nov 2021 12:51    133    |  101    |  9      ----------------------------<  107    3.7     |  25     |  1.32   22 Nov 2021 09:04    134    |  103    |  11     ----------------------------<  110    3.9     |  25     |  1.50   21 Nov 2021 07:45    137    |  104    |  20     ----------------------------<  139    4.1     |  22     |  1.82     Ca    8.4        24 Nov 2021 06:58  Ca    8.5        23 Nov 2021 12:51  Ca    8.8        22 Nov 2021 09:04  Ca    9.6        21 Nov 2021 07:45  Phos  2.1       23 Nov 2021 12:51  Phos  1.8       22 Nov 2021 09:04  Mg     2.1       23 Nov 2021 12:51  Mg     2.1       22 Nov 2021 09:04    TPro  7.8    /  Alb  3.9    /  TBili  0.6    /  DBili  x      /  AST  267    /  ALT  155    /  AlkPhos  50     21 Nov 2021 07:45          Urine Studies:          RADIOLOGY & ADDITIONAL STUDIES:

## 2021-11-24 NOTE — CONSULT NOTE ADULT - REASON FOR ADMISSION
Renal infarction, trauma

## 2021-11-24 NOTE — CONSULT NOTE ADULT - CONSULT REQUESTED DATE/TIME
23-Nov-2021 10:53
22-Nov-2021 12:30
22-Nov-2021 13:55
24-Nov-2021 10:04
24-Nov-2021 11:18
24-Nov-2021 08:30

## 2021-11-25 LAB
ANION GAP SERPL CALC-SCNC: 6 MMOL/L — SIGNIFICANT CHANGE UP (ref 5–17)
BUN SERPL-MCNC: 9 MG/DL — SIGNIFICANT CHANGE UP (ref 7–23)
CALCIUM SERPL-MCNC: 8.8 MG/DL — SIGNIFICANT CHANGE UP (ref 8.5–10.1)
CHLORIDE SERPL-SCNC: 98 MMOL/L — SIGNIFICANT CHANGE UP (ref 96–108)
CO2 SERPL-SCNC: 27 MMOL/L — SIGNIFICANT CHANGE UP (ref 22–31)
CREAT SERPL-MCNC: 1.23 MG/DL — SIGNIFICANT CHANGE UP (ref 0.5–1.3)
GLUCOSE SERPL-MCNC: 99 MG/DL — SIGNIFICANT CHANGE UP (ref 70–99)
HCT VFR BLD CALC: 32.8 % — LOW (ref 39–50)
HGB BLD-MCNC: 10.7 G/DL — LOW (ref 13–17)
MAGNESIUM SERPL-MCNC: 2.5 MG/DL — SIGNIFICANT CHANGE UP (ref 1.6–2.6)
MCHC RBC-ENTMCNC: 30.1 PG — SIGNIFICANT CHANGE UP (ref 27–34)
MCHC RBC-ENTMCNC: 32.6 GM/DL — SIGNIFICANT CHANGE UP (ref 32–36)
MCV RBC AUTO: 92.4 FL — SIGNIFICANT CHANGE UP (ref 80–100)
PHOSPHATE SERPL-MCNC: 2.3 MG/DL — LOW (ref 2.5–4.5)
PLATELET # BLD AUTO: 275 K/UL — SIGNIFICANT CHANGE UP (ref 150–400)
POTASSIUM SERPL-MCNC: 3.8 MMOL/L — SIGNIFICANT CHANGE UP (ref 3.5–5.3)
POTASSIUM SERPL-SCNC: 3.8 MMOL/L — SIGNIFICANT CHANGE UP (ref 3.5–5.3)
RBC # BLD: 3.55 M/UL — LOW (ref 4.2–5.8)
RBC # FLD: 14.1 % — SIGNIFICANT CHANGE UP (ref 10.3–14.5)
SODIUM SERPL-SCNC: 131 MMOL/L — LOW (ref 135–145)
WBC # BLD: 11.32 K/UL — HIGH (ref 3.8–10.5)
WBC # FLD AUTO: 11.32 K/UL — HIGH (ref 3.8–10.5)

## 2021-11-25 PROCEDURE — 99232 SBSQ HOSP IP/OBS MODERATE 35: CPT

## 2021-11-25 PROCEDURE — 99231 SBSQ HOSP IP/OBS SF/LOW 25: CPT

## 2021-11-25 RX ORDER — HYDROMORPHONE HYDROCHLORIDE 2 MG/ML
1 INJECTION INTRAMUSCULAR; INTRAVENOUS; SUBCUTANEOUS EVERY 4 HOURS
Refills: 0 | Status: DISCONTINUED | OUTPATIENT
Start: 2021-11-25 | End: 2021-11-26

## 2021-11-25 RX ORDER — HYDROMORPHONE HYDROCHLORIDE 2 MG/ML
1 INJECTION INTRAMUSCULAR; INTRAVENOUS; SUBCUTANEOUS EVERY 4 HOURS
Refills: 0 | Status: DISCONTINUED | OUTPATIENT
Start: 2021-11-25 | End: 2021-11-25

## 2021-11-25 RX ORDER — SODIUM,POTASSIUM PHOSPHATES 278-250MG
1 POWDER IN PACKET (EA) ORAL
Refills: 0 | Status: COMPLETED | OUTPATIENT
Start: 2021-11-25 | End: 2021-11-25

## 2021-11-25 RX ADMIN — Medication 1 TABLET(S): at 12:12

## 2021-11-25 RX ADMIN — OXYCODONE HYDROCHLORIDE 10 MILLIGRAM(S): 5 TABLET ORAL at 14:46

## 2021-11-25 RX ADMIN — HYDROMORPHONE HYDROCHLORIDE 1 MILLIGRAM(S): 2 INJECTION INTRAMUSCULAR; INTRAVENOUS; SUBCUTANEOUS at 12:10

## 2021-11-25 RX ADMIN — HYDROMORPHONE HYDROCHLORIDE 1 MILLIGRAM(S): 2 INJECTION INTRAMUSCULAR; INTRAVENOUS; SUBCUTANEOUS at 16:45

## 2021-11-25 RX ADMIN — OXYCODONE HYDROCHLORIDE 10 MILLIGRAM(S): 5 TABLET ORAL at 10:37

## 2021-11-25 RX ADMIN — HEPARIN SODIUM 5000 UNIT(S): 5000 INJECTION INTRAVENOUS; SUBCUTANEOUS at 20:56

## 2021-11-25 RX ADMIN — HEPARIN SODIUM 5000 UNIT(S): 5000 INJECTION INTRAVENOUS; SUBCUTANEOUS at 05:25

## 2021-11-25 RX ADMIN — HYDROMORPHONE HYDROCHLORIDE 1 MILLIGRAM(S): 2 INJECTION INTRAMUSCULAR; INTRAVENOUS; SUBCUTANEOUS at 17:10

## 2021-11-25 RX ADMIN — HYDROMORPHONE HYDROCHLORIDE 1 MILLIGRAM(S): 2 INJECTION INTRAMUSCULAR; INTRAVENOUS; SUBCUTANEOUS at 15:44

## 2021-11-25 RX ADMIN — OXYCODONE HYDROCHLORIDE 10 MILLIGRAM(S): 5 TABLET ORAL at 05:30

## 2021-11-25 RX ADMIN — POLYETHYLENE GLYCOL 3350 17 GRAM(S): 17 POWDER, FOR SOLUTION ORAL at 10:37

## 2021-11-25 RX ADMIN — OXYCODONE HYDROCHLORIDE 10 MILLIGRAM(S): 5 TABLET ORAL at 11:30

## 2021-11-25 RX ADMIN — SODIUM CHLORIDE 3 MILLILITER(S): 9 INJECTION INTRAMUSCULAR; INTRAVENOUS; SUBCUTANEOUS at 05:24

## 2021-11-25 RX ADMIN — Medication 1 TABLET(S): at 10:37

## 2021-11-25 RX ADMIN — HYDROMORPHONE HYDROCHLORIDE 1 MILLIGRAM(S): 2 INJECTION INTRAMUSCULAR; INTRAVENOUS; SUBCUTANEOUS at 07:35

## 2021-11-25 RX ADMIN — OXYCODONE HYDROCHLORIDE 10 MILLIGRAM(S): 5 TABLET ORAL at 18:49

## 2021-11-25 RX ADMIN — SENNA PLUS 2 TABLET(S): 8.6 TABLET ORAL at 20:57

## 2021-11-25 RX ADMIN — HEPARIN SODIUM 5000 UNIT(S): 5000 INJECTION INTRAVENOUS; SUBCUTANEOUS at 14:46

## 2021-11-25 RX ADMIN — HYDROMORPHONE HYDROCHLORIDE 1 MILLIGRAM(S): 2 INJECTION INTRAMUSCULAR; INTRAVENOUS; SUBCUTANEOUS at 08:00

## 2021-11-25 RX ADMIN — HYDROMORPHONE HYDROCHLORIDE 1 MILLIGRAM(S): 2 INJECTION INTRAMUSCULAR; INTRAVENOUS; SUBCUTANEOUS at 21:26

## 2021-11-25 RX ADMIN — OXYCODONE HYDROCHLORIDE 10 MILLIGRAM(S): 5 TABLET ORAL at 19:46

## 2021-11-25 RX ADMIN — OXYCODONE HYDROCHLORIDE 10 MILLIGRAM(S): 5 TABLET ORAL at 15:40

## 2021-11-25 RX ADMIN — HYDROMORPHONE HYDROCHLORIDE 1 MILLIGRAM(S): 2 INJECTION INTRAMUSCULAR; INTRAVENOUS; SUBCUTANEOUS at 20:56

## 2021-11-25 NOTE — PROGRESS NOTE ADULT - PROVIDER SPECIALTY LIST ADULT
Surgery
Hospitalist
Nephrology
Trauma Surgery

## 2021-11-25 NOTE — PROGRESS NOTE ADULT - SUBJECTIVE AND OBJECTIVE BOX
CC:Patient is a 36y old  Male who presents with a chief complaint of Renal infarction, trauma (22 Nov 2021 13:54)      Subjective:  Pt seen and examined at bedside. Pt is AAOx3, pt in no acute distress. Pt c/o left sided abd pain. Pt denied c/o fever, chills, chest pain, SOB, N/V/D, extremity pain or dysfunction, hemoptysis, hematemesis, hematuria, hematochexia, headache, diplopia, vertigo, dizzyness. Pt states (+) void, (+) ambulation, (+) bowel function, advised diet advancement/trial (pt has poor appetite)     ROS:  otherwise as abovementioned ROS    Vital Signs Last 24 Hrs  T(C): 38.3 (23 Nov 2021 10:40), Max: 38.4 (22 Nov 2021 20:32)  T(F): 100.9 (23 Nov 2021 10:40), Max: 101.2 (22 Nov 2021 20:32)  HR: 106 (23 Nov 2021 10:40) (101 - 123)  BP: 137/91 (23 Nov 2021 10:40) (109/67 - 144/88)  BP(mean): 102 (23 Nov 2021 08:41) (102 - 102)  RR: 18 (23 Nov 2021 10:40) (16 - 18)  SpO2: 97% (23 Nov 2021 10:40) (94% - 97%)    Labs:      CARDIAC MARKERS ( 22 Nov 2021 09:04 )  x     / x     / 1224 U/L / x     / x                                11.5   x     )-----------( x        ( 23 Nov 2021 08:21 )             35.7     CBC Full  -  ( 23 Nov 2021 08:21 )  WBC Count : x  RBC Count : x  Hemoglobin : 11.5 g/dL  Hematocrit : 35.7 %  Platelet Count - Automated : x  Mean Cell Volume : x  Mean Cell Hemoglobin : x  Mean Cell Hemoglobin Concentration : x  Auto Neutrophil # : x  Auto Lymphocyte # : x  Auto Monocyte # : x  Auto Eosinophil # : x  Auto Basophil # : x  Auto Neutrophil % : x  Auto Lymphocyte % : x  Auto Monocyte % : x  Auto Eosinophil % : x  Auto Basophil % : x    11-22    134<L>  |  103  |  11  ----------------------------<  110<H>  3.9   |  25  |  1.50<H>    Ca    8.8      22 Nov 2021 09:04  Phos  1.8     11-22  Mg     2.1     11-22              Meds:  acetaminophen     Tablet .. 650 milliGRAM(s) Oral every 6 hours PRN  dextrose 5% + sodium chloride 0.45% with potassium chloride 20 mEq/L 1000 milliLiter(s) IV Continuous <Continuous>  heparin   Injectable 5000 Unit(s) SubCutaneous every 8 hours  HYDROmorphone  Injectable 1 milliGRAM(s) IV Push every 4 hours PRN  ondansetron Injectable 4 milliGRAM(s) IV Push every 6 hours PRN  polyethylene glycol 3350 17 Gram(s) Oral daily  senna 2 Tablet(s) Oral at bedtime  simethicone 80 milliGRAM(s) Chew every 6 hours PRN  sodium chloride 0.9% lock flush 3 milliLiter(s) IV Push every 8 hours      Radiology:  < from: Xray Hand 3 Views, Right (11.21.21 @ 09:15) >  EXAM:  XR WRIST COMP MIN 3 VIEWS RT                          EXAM:  XR HAND MIN 3 VIEWS RT                          EXAM:  XR FOREARM 2 VIEWS RT                            PROCEDURE DATE:  11/21/2021          INTERPRETATION:  History: Fall downstairs.    FINDINGS: Frontal and lateral projections right forearm, frontal, lateral and oblique projections of the right wrist and hand.    Triquetral fracture. On the frontal projection of the wrist, there is a small calcific density/fragment seen proximal to the medial carpal row.    Impression:    Triquetral fracture. Small calcific density/fragment proximal to the medial carpal row.      --- End of Report ---            JEZ LIM MD; Attending Radiologist  This document has been electronically signed. Nov 21 2021  4:22PM    < end of copied text >  < from: CT Abdomen and Pelvis w/ IV Cont (11.21.21 @ 08:29) >  EXAM:  CT ABDOMEN AND PELVIS IC                          EXAM:  CT CHEST IC                            PROCEDURE DATE:  11/21/2021          INTERPRETATION:  CLINICAL INFORMATION: Trauma. Loss of consciousness. Fall down 7 steps.    COMPARISON: None.    PROCEDURE:  CT of the Chest, Abdomen and Pelvis was performed with intravenous contrast.  Imaging was performed through the chest in the arterial phase followed by imaging of the abdomen and pelvis in the portal venous phase.  Intravenous contrast: 90 ml Omnipaque 350. 10 ml discarded.  Oral contrast:None.  Sagittal and coronal reformats were performed.    FINDINGS:    CHEST:    LUNGS AND LARGE AIRWAYS: Patent central airways. No pulmonary nodules.  PLEURA: No pleural effusion.  VESSELS: Within normal limits.  HEART: Heart size is normal. No pericardial effusion.  MEDIASTINUM AND AILYN: No lymphadenopathy.  CHEST WALL AND LOWER NECK: Within normal limits.    ABDOMEN AND PELVIS:    LIVER: Within normal limits.  BILE DUCTS: Normal caliber.  GALLBLADDER: Within normal limits.  SPLEEN: Within normal limits.  PANCREAS: Within normal limits.  ADRENALS: Left adrenal infarct. Right adrenal gland within normal limits.  KIDNEYS/URETERS:  There are 2 left renal arteries. Irregular luminal narrowing is present in the renal artery to the left upper pole with small amount of opacification of the more distal renal artery branches and renal infarct involving the mid and upper pole of left. Small amount of hemorrhagic free fluid in the right pararenal space. Findings are consistent with vascular injury. No evidence of active extravasation.    Right kidney within normal limits.    BLADDER: Within normal limits.  REPRODUCTIVE ORGANS: Prostate within normal limits.    BOWEL: No bowel obstruction. Appendix is normal.  PERITONEUM: No ascites.  VESSELS:  Within normal limits.  RETROPERITONEUM: No lymphadenopathy. Left paranephric retroperitoneal hematoma, as described above.  ABDOMINAL WALL: Within normal limits.  BONES: Within normal limits.    IMPRESSION:    *  Duplicated left renal arteries.  *  Vascular injury involving the left upper renal artery with small amount of adjacent retroperitoneal hematoma and infarct involving the mid and upper pole of the left kidney.  *  No evidence of active bleeding.  *  Left adrenal infarct.    These findings were discussed with Dr. MALIKA LOZOYA [phone] at 11/21/2021 8:50 AM who communicates understanding.    --- End of Report ---            MIREYA MOONEY MD; Attending Radiologist  This document has been electronically signed. Nov 21 2021  8:54AM    < end of copied text >      Physical exam:  GCS of 15  Pt is aaox3  Pt in no acute distress  Airway is patent  Breathing is symmetric and unlabored  Neuro: CN II-XII grossly intact  Psych: normal affect  HEENT: normocephalic, MARIS, EOM wnl, no gross craniofacial bony pathology to exam  Neck: No tracheal deviation, no JVD, no crepitus, no ecchymosis, no hematoma  Chest: No gross rib or sternal pathology or tenderness to exam, no crepitus, no ecchymosis, no hematoma  Resp: CTAB  CVS: S1S2(+)  ABD: bowel sounds (+), soft, mild left > right sided tenderness (known left retroperitoneal hematoma and renal injury), non distended grossly, no rebound, no guarding, no rigidity, no pelvic instability to exam  EXT: right hand in splint per ortho, no calf tenderness or edema to exam b/l, pt has good capillary refill in all digits. Sensoromotor function grossly intact to limited exam, on VTE prophylaxis  Skin: no adverse skin changes to exam      
c/c: left facial pain/abd pain, right hand pain    HPI: 36M, pmh of HTN self discontinued his medication, who presented to the ER after a mechanical fall. He was walking down stairs when he missed a few steps and fell tumbling down. He tried to stop the fall by reaching out with his right hand. He briefly passed out and woke up in a pool of blood. He managed to get back up. Due to continued pain he came to the ER. Imaging showed Left adrenal infarct, left renal renal infarct, RP bleed around left kidney and vascular injury. He was also noted to have right hand triquetral fracture. He is admitted to trauma. Hospitalist service consulted for medical comanagement.     Pt seen and examined this am. Still with continued left flank/abd pain. NO sob/chest pain. Able to ambulate to bathroom. +bm. dry cough. No dysuria. no hematuir.a     ROS: all 10 systems reviewed and is as above otherwise negative.     Vital Signs Last 24 Hrs  T(C): 38.3 (23 Nov 2021 10:40), Max: 38.4 (22 Nov 2021 20:32)  T(F): 100.9 (23 Nov 2021 10:40), Max: 101.2 (22 Nov 2021 20:32)  HR: 106 (23 Nov 2021 10:40) (101 - 119)  BP: 137/91 (23 Nov 2021 10:40) (109/67 - 144/88)  BP(mean): 102 (23 Nov 2021 08:41) (102 - 102)  RR: 18 (23 Nov 2021 10:40) (16 - 18)  SpO2: 97% (23 Nov 2021 10:40) (94% - 97%)PHYSICAL EXAM:    GENERAL: Comfortable, no acute distress   HEAD:  Normocephalic, Left facial swelling/abrasions related to fall.   EYES: EOMI, PERRLA  HEENT: Moist mucous membranes  NECK: Supple, No JVD  NERVOUS SYSTEM:  Alert & Oriented X3, Motor Strength 5/5 B/L upper and lower extremities  CHEST/LUNG: Clear to auscultation bilaterally  HEART: Regular rate and rhythm  ABDOMEN: Soft, Left abdominal /flank tenderness. non distended, bs+  GENITOURINARY: Voiding, no palpable bladder  EXTREMITIES:   No clubbing, cyanosis, or edema  MUSCULOSKELETAL-Right hand in splint.   SKIN-no rash  LABS:                        11.5   x     )-----------( x        ( 23 Nov 2021 08:21 )             35.7     11-22    134<L>  |  103  |  11  ----------------------------<  110<H>  3.9   |  25  |  1.50<H>    Ca    8.8      22 Nov 2021 09:04  Phos  1.8     11-22  Mg     2.1     11-22      CARDIAC MARKERS ( 22 Nov 2021 09:04 )  x     / x     / 1224 U/L / x     / x        Xray Hand 3 Views, Right (11.21.21 @ 09:15) >  Triquetral fracture. Small calcific density/fragment proximal to the medial carpal row.     CT Abdomen and Pelvis w/ IV Cont (11.21.21 @ 08:29) >  IMPRESSION:  *  Duplicated left renal arteries.  *  Vascular injury involving the left upper renal artery with small amount of adjacent retroperitoneal hematoma and infarct involving the mid and upper pole of the left kidney.  *  No evidence of active bleeding.  *  Left adrenal infarct.    CT Head No Cont (11.21.21 @ 08:19) >  FINDINGS:  There is no compelling evidence for an acute transcortical infarction. There is no evidence of mass, mass effect, midline shift or extra-axial fluid collection. The lateral ventricles and cortical sulci are age-appropriate in size and configuration. The orbits, mastoid air cells and visualized paranasal sinuses are normal. The calvarium is intact. Consider follow-up CT or MRI as clinically warranted.       CT Head No Cont (11.21.21 @ 08:19) >  There is no evidence of acute fracture to the facial bones. The mandible is intact and the mandibular condyles are well located. The ocular globes and orbital walls are intact. Mild mucosal thickening in the right maxillary sinus alveolar recess. The remaining paranasal sinuses are well developed and well-aerated. Mastoid air cells are clear. Mild left periorbital facial soft tissue swelling. Mild left premaxillary facial soft tissue swelling. Mild anterior traction soft tissue swelling.    EKG: sinus tachycardia.  MEDICATIONS  (STANDING):  dextrose 5% + sodium chloride 0.45% with potassium chloride 20 mEq/L 1000 milliLiter(s) (120 mL/Hr) IV Continuous <Continuous>  heparin   Injectable 5000 Unit(s) SubCutaneous every 8 hours  oxyCODONE    IR 10 milliGRAM(s) Oral once  polyethylene glycol 3350 17 Gram(s) Oral daily  senna 2 Tablet(s) Oral at bedtime  sodium chloride 0.9% lock flush 3 milliLiter(s) IV Push every 8 hours    MEDICATIONS  (PRN):  acetaminophen     Tablet .. 650 milliGRAM(s) Oral every 6 hours PRN Temp greater or equal to 38C (100.4F), Mild Pain (1 - 3)  HYDROmorphone  Injectable 1 milliGRAM(s) IV Push every 4 hours PRN Severe Pain (7 - 10)  ondansetron Injectable 4 milliGRAM(s) IV Push every 6 hours PRN Nausea and/or Vomiting  oxyCODONE    IR 10 milliGRAM(s) Oral every 4 hours PRN Moderate Pain (4 - 6)  simethicone 80 milliGRAM(s) Chew every 6 hours PRN Gas      ASSESSMENT AND PLAN:   36m, PMH as above a/w:    1. Mechanical fall/Left renal A injury with Left adrenal and renal infarct, RP hematoma, acute blood loss anemia  -management per trauma  -h/h now stable.   -nephrology consulted.   -pain control, meds adjusted  -incentive spirometry    2. ROBYN/Abnormal U/A:  -CPK not very high , mild rhabdo  -check bmp/cpk today.   -avoid nephrotoxics  -f/u nephrology recommendations.    3. Right Triquetral fracture:  -ortho eval appreciated  -nwb RUE in splint.    4. Facial soft tissue injuries:  -pain control  -ice    5. H/o HTN:  -BP normal off meds here.     6. Sinus tachycardia:  -likely related to pain  -adjust pain meds for better control    7. DVT px  -hep sq, hold if h/h drops      
Patient was seen and examined at the bedside this morning  No acute events overnight  Pain is still not well controlled  No nausea or vomiting  Tolerating diet and passing gas, no bowel movements yet  CT scan done yesterday and showed no significant change    O/E:  T(C): 36.7 (11-25-21 @ 09:03), Max: 36.9 (11-25-21 @ 00:10)  HR: 90 (11-25-21 @ 09:03) (90 - 100)  BP: 130/93 (11-25-21 @ 09:03) (130/93 - 154/99)  RR: 16 (11-25-21 @ 09:03) (16 - 16)  SpO2: 95% (11-25-21 @ 09:03) (95% - 98%)  Alert, conscious, oriented  No pallor, jaundice or cyanosis  Not in pain or distress  Chest clear, equal air entry bilaterally  Abdomen Tenderness in the left side of the abdomen  Extremities: No swelling or tenderness                          10.7   11.32 )-----------( 275      ( 25 Nov 2021 07:48 )             32.8   11-25    131<L>  |  98  |  9   ----------------------------<  99  3.8   |  27  |  1.23    Ca    8.8      25 Nov 2021 07:48  Phos  2.3     11-25  Mg     2.5     11-25    
Chart reviewed, full consult to follow
c/c: left facial pain/abd pain, right hand pain    HPI: 36M, pmh of HTN self discontinued his medication, who presented to the ER after a mechanical fall. He was walking down stairs when he missed a few steps and fell tumbling down. He tried to stop the fall by reaching out with his right hand. He briefly passed out and woke up in a pool of blood. He managed to get back up. Due to continued pain he came to the ER. Imaging showed Left adrenal infarct, left renal renal infarct, RP bleed around left kidney and vascular injury. He was also noted to have right hand triquetral fracture. He is admitted to trauma. Hospitalist service consulted for medical comanagement.     11/25/21- c/o considerable amount of pain, mainly periumbilical and flank areas. No nausea/vomiting. No splint noted RUE    ROS: all 10 systems reviewed and is as above otherwise negative.     Vital Signs Last 24 Hrs  T(C): 36.7 (25 Nov 2021 09:03), Max: 36.9 (25 Nov 2021 00:10)  T(F): 98.1 (25 Nov 2021 09:03), Max: 98.4 (25 Nov 2021 00:10)  HR: 90 (25 Nov 2021 09:03) (90 - 100)  BP: 130/93 (25 Nov 2021 09:03) (130/93 - 154/99)  BP(mean): --  RR: 16 (25 Nov 2021 09:03) (16 - 16)  SpO2: 95% (25 Nov 2021 09:03) (95% - 98%)    PHYSICAL EXAM:  GENERAL: Comfortable, no acute distress   HEAD:  Normocephalic, Left facial swelling/abrasions related to fall.   EYES: EOMI, PERRLA  HEENT: Moist mucous membranes  NECK: Supple, No JVD  NERVOUS SYSTEM:  Alert & Oriented X3, Motor Strength 5/5 B/L upper and lower extremities  CHEST/LUNG: Clear to auscultation bilaterally  HEART: Regular rate and rhythm  ABDOMEN: Soft, Left abdominal /flank tenderness. non distended, bs+  GENITOURINARY: Voiding, no palpable bladder  EXTREMITIES:   No clubbing, cyanosis, or edema  MUSCULOSKELETAL- No splint on right side(?)  SKIN-no rash    LABS:                                 10.7   11.32 )-----------( 275      ( 25 Nov 2021 07:48 )             32.8     25 Nov 2021 07:48    131    |  98     |  9      ----------------------------<  99     3.8     |  27     |  1.23     Ca    8.8        25 Nov 2021 07:48  Phos  2.3       25 Nov 2021 07:48  Mg     2.5       25 Nov 2021 07:48    CAPILLARY BLOOD GLUCOSE  CARDIAC MARKERS ( 24 Nov 2021 06:58 )  x     / x     / 273 U/L / x     / x        Xray Hand 3 Views, Right (11.21.21 @ 09:15) >  Triquetral fracture. Small calcific density/fragment proximal to the medial carpal row.     CT Abdomen and Pelvis w/ IV Cont (11.21.21 @ 08:29) >  IMPRESSION:  *  Duplicated left renal arteries.  *  Vascular injury involving the left upper renal artery with small amount of adjacent retroperitoneal hematoma and infarct involving the mid and upper pole of the left kidney.  *  No evidence of active bleeding.  *  Left adrenal infarct.    CT Head No Cont (11.21.21 @ 08:19) >  FINDINGS:  There is no compelling evidence for an acute transcortical infarction. There is no evidence of mass, mass effect, midline shift or extra-axial fluid collection. The lateral ventricles and cortical sulci are age-appropriate in size and configuration. The orbits, mastoid air cells and visualized paranasal sinuses are normal. The calvarium is intact. Consider follow-up CT or MRI as clinically warranted.       CT Head No Cont (11.21.21 @ 08:19) >  There is no evidence of acute fracture to the facial bones. The mandible is intact and the mandibular condyles are well located. The ocular globes and orbital walls are intact. Mild mucosal thickening in the right maxillary sinus alveolar recess. The remaining paranasal sinuses are well developed and well-aerated. Mastoid air cells are clear. Mild left periorbital facial soft tissue swelling. Mild left premaxillary facial soft tissue swelling. Mild anterior traction soft tissue swelling.    EKG: sinus tachycardia.    MEDICATIONS  (STANDING):  heparin   Injectable 5000 Unit(s) SubCutaneous every 8 hours  polyethylene glycol 3350 17 Gram(s) Oral daily  senna 2 Tablet(s) Oral at bedtime  sodium chloride 0.9% lock flush 3 milliLiter(s) IV Push every 8 hours    MEDICATIONS  (PRN):  acetaminophen     Tablet .. 650 milliGRAM(s) Oral every 6 hours PRN Temp greater or equal to 38C (100.4F), Mild Pain (1 - 3)  HYDROmorphone  Injectable 1 milliGRAM(s) IV Push every 4 hours PRN Severe Pain (7 - 10)  HYDROmorphone  Injectable 1 milliGRAM(s) SubCutaneous every 4 hours PRN Severe Pain (7 - 10)  ondansetron Injectable 4 milliGRAM(s) IV Push every 6 hours PRN Nausea and/or Vomiting  oxyCODONE    IR 10 milliGRAM(s) Oral every 4 hours PRN Moderate Pain (4 - 6)  simethicone 80 milliGRAM(s) Chew every 6 hours PRN Gas    ASSESSMENT AND PLAN:  36m, PMH as above a/w:    1. Mechanical fall/Left renal A injury with Left adrenal and renal infarct, RP hematoma, acute blood loss anemia  -management per trauma  -h/h now relatively stable.   -nephrology/urology following  -seen by IR no vascular intervention needed.   -pain meds prn  -incentive spirometry    2. ROBYN- resolved  -hyponatremia noted- will monitor bmp    3. Right Triquetral fracture:  -ortho eval appreciated  -nwb RUE - ?splint    4. Facial soft tissue injuries:  -pain control  -ice    5. H/o HTN:  -BP normal off meds here.     6. Sinus tachycardia:  -likely related to pain  -adjusted pain meds for better control    7. DVT px  -hep sq, hold if h/h drops    8. Dispo:  per trauma  likely dc planning once pain controlled    
c/c: left facial pain/abd pain, right hand pain    HPI: 36M, pmh of HTN self discontinued his medication, who presented to the ER after a mechanical fall. He was walking down stairs when he missed a few steps and fell tumbling down. He tried to stop the fall by reaching out with his right hand. He briefly passed out and woke up in a pool of blood. He managed to get back up. Due to continued pain he came to the ER. Imaging showed Left adrenal infarct, left renal renal infarct, RP bleed around left kidney and vascular injury. He was also noted to have right hand triquetral fracture. He is admitted to trauma. Hospitalist service consulted for medical comanagement.     pt seen and examined this am. Still with significant pain. Not eating much d/t pain. No sob/chest pain. No difficulty voiding. no hematuria.    ROS: all 10 systems reviewed and is as above otherwise negative.     Vital Signs Last 24 Hrs  T(C): 36.9 (24 Nov 2021 08:30), Max: 37.9 (23 Nov 2021 19:55)  T(F): 98.4 (24 Nov 2021 08:30), Max: 100.3 (23 Nov 2021 19:55)  HR: 95 (24 Nov 2021 08:30) (95 - 115)  BP: 130/86 (24 Nov 2021 08:30) (128/83 - 151/88)  BP(mean): 97 (24 Nov 2021 08:30) (97 - 97)  RR: 16 (24 Nov 2021 08:30) (16 - 18)  SpO2: 97% (24 Nov 2021 08:30) (95% - 97%)      PHYSICAL EXAM:    GENERAL: Comfortable, no acute distress   HEAD:  Normocephalic, Left facial swelling/abrasions related to fall.   EYES: EOMI, PERRLA  HEENT: Moist mucous membranes  NECK: Supple, No JVD  NERVOUS SYSTEM:  Alert & Oriented X3, Motor Strength 5/5 B/L upper and lower extremities  CHEST/LUNG: Clear to auscultation bilaterally  HEART: Regular rate and rhythm  ABDOMEN: Soft, Left abdominal /flank tenderness. non distended, bs+  GENITOURINARY: Voiding, no palpable bladder  EXTREMITIES:   No clubbing, cyanosis, or edema  MUSCULOSKELETAL-Right hand in splint.   SKIN-no rash      LABS:                        10.4   13.61 )-----------( 231      ( 24 Nov 2021 06:58 )             31.6     11-24    131<L>  |  98  |  6<L>  ----------------------------<  115<H>  3.5   |  27  |  1.20    Ca    8.4<L>      24 Nov 2021 06:58  Phos  2.1     11-23  Mg     2.1     11-23          Xray Hand 3 Views, Right (11.21.21 @ 09:15) >  Triquetral fracture. Small calcific density/fragment proximal to the medial carpal row.     CT Abdomen and Pelvis w/ IV Cont (11.21.21 @ 08:29) >  IMPRESSION:  *  Duplicated left renal arteries.  *  Vascular injury involving the left upper renal artery with small amount of adjacent retroperitoneal hematoma and infarct involving the mid and upper pole of the left kidney.  *  No evidence of active bleeding.  *  Left adrenal infarct.    CT Head No Cont (11.21.21 @ 08:19) >  FINDINGS:  There is no compelling evidence for an acute transcortical infarction. There is no evidence of mass, mass effect, midline shift or extra-axial fluid collection. The lateral ventricles and cortical sulci are age-appropriate in size and configuration. The orbits, mastoid air cells and visualized paranasal sinuses are normal. The calvarium is intact. Consider follow-up CT or MRI as clinically warranted.       CT Head No Cont (11.21.21 @ 08:19) >  There is no evidence of acute fracture to the facial bones. The mandible is intact and the mandibular condyles are well located. The ocular globes and orbital walls are intact. Mild mucosal thickening in the right maxillary sinus alveolar recess. The remaining paranasal sinuses are well developed and well-aerated. Mastoid air cells are clear. Mild left periorbital facial soft tissue swelling. Mild left premaxillary facial soft tissue swelling. Mild anterior traction soft tissue swelling.    EKG: sinus tachycardia.    MEDICATIONS  (STANDING):  heparin   Injectable 5000 Unit(s) SubCutaneous every 8 hours  polyethylene glycol 3350 17 Gram(s) Oral daily  senna 2 Tablet(s) Oral at bedtime  sodium chloride 0.9% lock flush 3 milliLiter(s) IV Push every 8 hours    MEDICATIONS  (PRN):  acetaminophen     Tablet .. 650 milliGRAM(s) Oral every 6 hours PRN Temp greater or equal to 38C (100.4F), Mild Pain (1 - 3)  HYDROmorphone  Injectable 1 milliGRAM(s) IV Push every 4 hours PRN Severe Pain (7 - 10)  ondansetron Injectable 4 milliGRAM(s) IV Push every 6 hours PRN Nausea and/or Vomiting  oxyCODONE    IR 10 milliGRAM(s) Oral every 4 hours PRN Moderate Pain (4 - 6)  simethicone 80 milliGRAM(s) Chew every 6 hours PRN Gas      ASSESSMENT AND PLAN:   36m, PMH as above a/w:    1. Mechanical fall/Left renal A injury with Left adrenal and renal infarct, RP hematoma, acute blood loss anemia  -management per trauma  -h/h now relatively stable.   -nephrology/urology following  -seen by IR no vascular intervention needed.   -pain control, meds adjusted yesterday  -incentive spirometry    2. ROBYN  -improved with ivf.   -hyponatremia noted, pt was on D51/2, will dc.  -repeat bmp in am.     3. Right Triquetral fracture:  -ortho eval appreciated  -nwb RUE in splint.    4. Facial soft tissue injuries:  -pain control  -ice    5. H/o HTN:  -BP normal off meds here.     6. Sinus tachycardia:  -likely related to pain  -adjusted pain meds for better control    7. DVT px  -hep sq, hold if h/h drops    8. Dispo:  per trauma  likely dc planning once pain controlled    
CC:Patient is a 36y old  Male who presents with a chief complaint of Renal infarction, trauma (24 Nov 2021 11:47)      Subjective:  Pt seen and examined at bedside. Pt is AAOx3, pt in no acute distress. Pt c/o left sided abd pain. Pt denied c/o fever, chills, chest pain, SOB, N/V/D, extremity pain or dysfunction, hemoptysis, hematemesis, hematuria, hematochexia, headache, diplopia, vertigo, dizzyness. Pt states (+) void, (+) ambulation, (+) bowel function, advised diet advancement/trial (pt has poor appetite)     ROS:  otherwise as abovementioned ROS    Vital Signs Last 24 Hrs  T(C): 36.9 (24 Nov 2021 08:30), Max: 37.9 (23 Nov 2021 19:55)  T(F): 98.4 (24 Nov 2021 08:30), Max: 100.3 (23 Nov 2021 19:55)  HR: 95 (24 Nov 2021 08:30) (95 - 115)  BP: 130/86 (24 Nov 2021 08:30) (128/83 - 151/88)  BP(mean): 97 (24 Nov 2021 08:30) (97 - 97)  RR: 16 (24 Nov 2021 08:30) (16 - 18)  SpO2: 97% (24 Nov 2021 08:30) (95% - 97%)    Labs:                      10.4   13.61 )-----------( 231      ( 24 Nov 2021 06:58 )             31.6     CBC Full  -  ( 24 Nov 2021 06:58 )  WBC Count : 13.61 K/uL  RBC Count : 3.39 M/uL  Hemoglobin : 10.4 g/dL  Hematocrit : 31.6 %  Platelet Count - Automated : 231 K/uL  Mean Cell Volume : 93.2 fl  Mean Cell Hemoglobin : 30.7 pg  Mean Cell Hemoglobin Concentration : 32.9 gm/dL  Auto Neutrophil # : x  Auto Lymphocyte # : x  Auto Monocyte # : x  Auto Eosinophil # : x  Auto Basophil # : x  Auto Neutrophil % : x  Auto Lymphocyte % : x  Auto Monocyte % : x  Auto Eosinophil % : x  Auto Basophil % : x    11-24    131<L>  |  98  |  6<L>  ----------------------------<  115<H>  3.5   |  27  |  1.20    Ca    8.4<L>      24 Nov 2021 06:58  Phos  2.1     11-23  Mg     2.1     11-23              Meds:  acetaminophen     Tablet .. 650 milliGRAM(s) Oral every 6 hours PRN  heparin   Injectable 5000 Unit(s) SubCutaneous every 8 hours  HYDROmorphone  Injectable 1 milliGRAM(s) IV Push every 4 hours PRN  ondansetron Injectable 4 milliGRAM(s) IV Push every 6 hours PRN  oxyCODONE    IR 10 milliGRAM(s) Oral every 4 hours PRN  polyethylene glycol 3350 17 Gram(s) Oral daily  senna 2 Tablet(s) Oral at bedtime  simethicone 80 milliGRAM(s) Chew every 6 hours PRN  sodium chloride 0.9% lock flush 3 milliLiter(s) IV Push every 8 hours      Radiology:  Pending r/p CT abd/pelvis    Physical exam:  GCS of 15  Pt is aaox3  Pt in no acute distress  Airway is patent  Breathing is symmetric and unlabored  Neuro: CN II-XII grossly intact  Psych: normal affect  HEENT: normocephalic, MARIS, EOM wnl, no gross craniofacial bony pathology to exam  Neck: No tracheal deviation, no JVD, no crepitus, no ecchymosis, no hematoma  Chest: No gross rib or sternal pathology or tenderness to exam, no crepitus, no ecchymosis, no hematoma  Resp: CTAB  CVS: S1S2(+)  ABD: bowel sounds (+), soft, mild left > right sided tenderness (known left retroperitoneal hematoma and renal injury), non distended grossly, no rebound, no guarding, no rigidity, no pelvic instability to exam  EXT: right hand in splint per ortho, no calf tenderness or edema to exam b/l, pt has good capillary refill in all digits. Sensoromotor function grossly intact to limited exam, on VTE prophylaxis  Skin: no adverse skin changes to exam  
CC:Patient is a 36y old  Male who presents with a chief complaint of Renal infarction, trauma (24 Nov 2021 12:00)      Subjective:  Pt seen and examined at bedside. Pt is AAOx3, pt in no acute distress. Pt c/o left sided abd pain, though improved since admission. Pt denied c/o fever, chills, chest pain, SOB, N/V/D, extremity pain or dysfunction, hemoptysis, hematemesis, hematuria, hematochexia, headache, diplopia, vertigo, dizzyness. Pt states (+) void, (+) ambulation with PT, (+) bowel function, + diet tolerance    ROS:  otherwise as abovementioned ROS    Vital Signs Last 24 Hrs  T(C): 36.9 (25 Nov 2021 00:10), Max: 36.9 (24 Nov 2021 08:30)  T(F): 98.4 (25 Nov 2021 00:10), Max: 98.4 (24 Nov 2021 08:30)  HR: 100 (25 Nov 2021 00:10) (95 - 100)  BP: 154/99 (25 Nov 2021 00:10) (130/86 - 154/99)  BP(mean): 97 (24 Nov 2021 08:30) (97 - 97)  RR: 16 (25 Nov 2021 00:10) (16 - 16)  SpO2: 95% (25 Nov 2021 00:10) (95% - 98%)    Labs:      CARDIAC MARKERS ( 24 Nov 2021 06:58 )  x     / x     / 273 U/L / x     / x                                10.4   13.61 )-----------( 231      ( 24 Nov 2021 06:58 )             31.6     CBC Full  -  ( 24 Nov 2021 06:58 )  WBC Count : 13.61 K/uL  RBC Count : 3.39 M/uL  Hemoglobin : 10.4 g/dL  Hematocrit : 31.6 %  Platelet Count - Automated : 231 K/uL  Mean Cell Volume : 93.2 fl  Mean Cell Hemoglobin : 30.7 pg  Mean Cell Hemoglobin Concentration : 32.9 gm/dL  Auto Neutrophil # : x  Auto Lymphocyte # : x  Auto Monocyte # : x  Auto Eosinophil # : x  Auto Basophil # : x  Auto Neutrophil % : x  Auto Lymphocyte % : x  Auto Monocyte % : x  Auto Eosinophil % : x  Auto Basophil % : x    11-24    131<L>  |  98  |  6<L>  ----------------------------<  115<H>  3.5   |  27  |  1.20    Ca    8.4<L>      24 Nov 2021 06:58  Phos  2.1     11-23  Mg     2.1     11-23              Meds:  acetaminophen     Tablet .. 650 milliGRAM(s) Oral every 6 hours PRN  heparin   Injectable 5000 Unit(s) SubCutaneous every 8 hours  HYDROmorphone  Injectable 1 milliGRAM(s) IV Push every 4 hours PRN  ondansetron Injectable 4 milliGRAM(s) IV Push every 6 hours PRN  oxyCODONE    IR 10 milliGRAM(s) Oral every 4 hours PRN  polyethylene glycol 3350 17 Gram(s) Oral daily  senna 2 Tablet(s) Oral at bedtime  simethicone 80 milliGRAM(s) Chew every 6 hours PRN  sodium chloride 0.9% lock flush 3 milliLiter(s) IV Push every 8 hours      Radiology:  < from: CT Abdomen and Pelvis w/ IV Cont (11.24.21 @ 13:51) >  EXAM:  CT ABDOMEN AND PELVIS IC                            PROCEDURE DATE:  11/24/2021          INTERPRETATION:  CLINICAL INFORMATION: Traumatic left renal artery dissection.    COMPARISON: Contrast enhanced trauma CT dated November 21, 2021.    CONTRAST/COMPLICATIONS:  IV Contrast: Omnipaque 350  90 cc administered   10 cc discarded  Oral Contrast: NONE  Complications: None reported at time of study completion    PROCEDURE:  CT of the Abdomen and Pelvis was performed.  Sagittal and coronal reformats were performed.    FINDINGS:  LOWER CHEST: Trace left layering left pleural fluid. Mild left lower lobe dependent atelectasis. 1.9 cm pleural-based consolidative opacity in the right lower lobe medially with air bronchograms.    LIVER: Within normal limits.  BILE DUCTS: Normal caliber.  GALLBLADDER: Within normal limits.  SPLEEN: Within normal limits.  PANCREAS: Within normal limits.  ADRENALS: Again is noted thickening of the left adrenal gland with adjacent stranding.  KIDNEYS/URETERS: Again is noted a diffuse dissection and occlusion of an accessory left renal artery with extensive infarction of the mid and upper pole. There is subtle collateral parenchymal enhancement in the region of the collecting system. No perinephric urinoma isidentified. There is moderate left perinephric stranding extending to the thickened left adrenal gland. No pseudoaneurysm is identified. The left renal vein is patent.    The lower pole of the left kidney is unremarkable in appearance and enhancement. There is no evidence of hydronephrosis or stone.    The right kidney is unremarkable in appearance and enhancement.    BLADDER: Within normal limits.  REPRODUCTIVE ORGANS:    BOWEL: No bowel obstruction. Appendix is normal.  PERITONEUM: No ascites.  VESSELS: The abdominal aorta is nonaneurysmal. The celiac artery, SMA, right renal artery, and NADIA are unremarkable.  RETROPERITONEUM/LYMPH NODES: No lymphadenopathy.  ABDOMINAL WALL: Within normal limits.  BONES: Stable.    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 No evidence of pseudoaneurysm or urinoma.    --- End of Report ---            RAINA RODGERS MD; Attending Radiologist  Thisdocument has been electronically signed. Nov 24 2021  2:27PM    < end of copied text >      Physical exam:  GCS of 15  Pt is aaox3  Pt in no acute distress  Airway is patent  Breathing is symmetric and unlabored  Neuro: CN II-XII grossly intact  Psych: normal affect  HEENT: normocephalic, MARIS, EOM wnl, no gross craniofacial bony pathology to exam  Neck: No tracheal deviation, no JVD, no crepitus, no ecchymosis, no hematoma  Chest: No gross rib or sternal pathology or tenderness to exam, no crepitus, no ecchymosis, no hematoma  Resp: CTAB  CVS: S1S2(+)  ABD: bowel sounds (+), soft, mild left > right sided tenderness (known left retroperitoneal hematoma and renal injury) stable/improved from prior exam, non distended grossly, no rebound, no guarding, no rigidity, no pelvic instability to exam  EXT: right hand in splint per ortho, no calf tenderness or edema to exam b/l, pt has good capillary refill in all digits. Sensoromotor function grossly intact to limited exam, on VTE prophylaxis  Skin: no adverse skin changes to exam    
PROGRESS NOTE:    Subjective:    Patient was seen and examined at bedside. No new complaint at this time, appears comfortable lying in bed.     Objective:    MEDICATIONS  (STANDING):  heparin   Injectable 5000 Unit(s) SubCutaneous every 8 hours  polyethylene glycol 3350 17 Gram(s) Oral daily  senna 2 Tablet(s) Oral at bedtime  sodium chloride 0.9% lock flush 3 milliLiter(s) IV Push every 8 hours    MEDICATIONS  (PRN):  acetaminophen     Tablet .. 650 milliGRAM(s) Oral every 6 hours PRN Temp greater or equal to 38C (100.4F), Mild Pain (1 - 3)  HYDROmorphone  Injectable 1 milliGRAM(s) IV Push every 4 hours PRN Severe Pain (7 - 10)  ondansetron Injectable 4 milliGRAM(s) IV Push every 6 hours PRN Nausea and/or Vomiting  oxyCODONE    IR 10 milliGRAM(s) Oral every 4 hours PRN Moderate Pain (4 - 6)  simethicone 80 milliGRAM(s) Chew every 6 hours PRN Gas    Vital Signs Last 24 Hrs  T(C): 36.9 (24 Nov 2021 08:30), Max: 38.3 (23 Nov 2021 10:40)  T(F): 98.4 (24 Nov 2021 08:30), Max: 100.9 (23 Nov 2021 10:40)  HR: 95 (24 Nov 2021 08:30) (95 - 115)  BP: 130/86 (24 Nov 2021 08:30) (128/83 - 151/88)  BP(mean): 97 (24 Nov 2021 08:30) (97 - 97)  RR: 16 (24 Nov 2021 08:30) (16 - 18)  SpO2: 97% (24 Nov 2021 08:30) (95% - 97%)    PHYSICAL EXAM:  GENERAL: NAD, lying in bed comfortably  HEAD:  Atraumatic, Normocephalic  EYES: EOMI, PERRLA, conjunctiva and sclera clear  ENT: Moist mucous membranes  NECK: Supple, No JVD  CHEST/LUNG: Unlabored respirations  HEART: Regular rate and rhythm  ABDOMEN: Bowel sounds present; Soft, LLQ tendernes, Nondistended  EXTREMITIES:  2+ Peripheral Pulses, brisk capillary refill. No clubbing, cyanosis, or edema  NERVOUS SYSTEM:  Alert & Oriented X3, speech clear. No deficits   MSK: FROM all 4 extremities, full and equal strength  SKIN: No rashes or lesions    I&O's Detail    23 Nov 2021 07:01  -  24 Nov 2021 07:00  --------------------------------------------------------  IN:    Oral Fluid: 600 mL  Total IN: 600 mL    OUT:  Total OUT: 0 mL    Total NET: 600 mL      LABS:                        10.4   13.61 )-----------( 231      ( 24 Nov 2021 06:58 )             31.6     24 Nov 2021 06:58    131    |  98     |  6      ----------------------------<  115    3.5     |  27     |  1.20     Ca    8.4        24 Nov 2021 06:58  Phos  2.1       23 Nov 2021 12:51  Mg     2.1       23 Nov 2021 12:51

## 2021-11-25 NOTE — PROGRESS NOTE ADULT - ASSESSMENT
A/P:  Right wrist/hand fracture  Left renal laceration/perinephric bleed  Left retroperitoneal hematoma  H/H stable  HD stable   on consult for renal pathology  Hospitalist on consult for medical management  Ortho on consult  GI/DVT prophylaxis  Pain control  Incentive spirometry  Serial abd exams  F/U labs, radiologic studies  Cont current care and meds  Pt will be monitored for signs of evolution/resolution of pathology and surgical intervention as required and warranted  Pt aware of and agrees with all of the above
Renal Infarct s/p fall down stairs  Hx of EtOH abuse  Elevated Cr, LFTs, LDH    monitor vitals  pain control prn  trend Cr, LFTs, LDH  IVF hydration  diet regular  appreciate  recs for renal infarct management  DVT ppx SCDs    Plan discussed with trauma surgical attending, Dr May  
A/P:  Right wrist/hand fracture  Left renal laceration/perinephric bleed  Left retroperitoneal hematoma  H/H stable  HD stable   on consult for renal pathology  Nephrology on consult  Hospitalist on consult for medical management  Ortho on consult  GI/DVT prophylaxis  Pain control  Incentive spirometry  Serial abd exams stable/improved  F/U labs  PT  SS for d/c planning  Cont current care and meds  Pt will be monitored for signs of evolution/resolution of pathology and surgical intervention as required and warranted  Pt aware of and agrees with all of the above
A 36 with trauma and left renal infarct  Still has significant pain and tenderness    Plan:  Pain control  Ambulation  Incentive spirometry  Diet as tolerated  Continue same other treatment    plan discussed with Dr May
A/P:  Right wrist fracture  Left renal laceration/perinephric bleed  Left retroperitoneal hematoma  H/H stable  HD stable   consult for renal pathology  Hospitalist consult for medical management  Ortho on consult  GI/DVT prophylaxis  Pain control  Incentive spirometry  Serial abd exams  F/U labs  Cont current care and meds  Pt will be monitored for signs of evolution/resolution of pathology and surgical intervention as required and warranted  Pt aware of and agrees with all of the above

## 2021-11-25 NOTE — PROGRESS NOTE ADULT - REASON FOR ADMISSION
Renal infarction, trauma

## 2021-11-26 ENCOUNTER — TRANSCRIPTION ENCOUNTER (OUTPATIENT)
Age: 36
End: 2021-11-26

## 2021-11-26 VITALS
TEMPERATURE: 99 F | OXYGEN SATURATION: 95 % | RESPIRATION RATE: 18 BRPM | HEART RATE: 87 BPM | DIASTOLIC BLOOD PRESSURE: 100 MMHG | SYSTOLIC BLOOD PRESSURE: 146 MMHG

## 2021-11-26 LAB
ANION GAP SERPL CALC-SCNC: 5 MMOL/L — SIGNIFICANT CHANGE UP (ref 5–17)
BUN SERPL-MCNC: 10 MG/DL — SIGNIFICANT CHANGE UP (ref 7–23)
CALCIUM SERPL-MCNC: 9.1 MG/DL — SIGNIFICANT CHANGE UP (ref 8.5–10.1)
CHLORIDE SERPL-SCNC: 99 MMOL/L — SIGNIFICANT CHANGE UP (ref 96–108)
CO2 SERPL-SCNC: 29 MMOL/L — SIGNIFICANT CHANGE UP (ref 22–31)
CREAT SERPL-MCNC: 1.2 MG/DL — SIGNIFICANT CHANGE UP (ref 0.5–1.3)
GLUCOSE SERPL-MCNC: 95 MG/DL — SIGNIFICANT CHANGE UP (ref 70–99)
HCT VFR BLD CALC: 33.3 % — LOW (ref 39–50)
HGB BLD-MCNC: 10.7 G/DL — LOW (ref 13–17)
MAGNESIUM SERPL-MCNC: 2.6 MG/DL — SIGNIFICANT CHANGE UP (ref 1.6–2.6)
MCHC RBC-ENTMCNC: 29.8 PG — SIGNIFICANT CHANGE UP (ref 27–34)
MCHC RBC-ENTMCNC: 32.1 GM/DL — SIGNIFICANT CHANGE UP (ref 32–36)
MCV RBC AUTO: 92.8 FL — SIGNIFICANT CHANGE UP (ref 80–100)
PHOSPHATE SERPL-MCNC: 3.3 MG/DL — SIGNIFICANT CHANGE UP (ref 2.5–4.5)
PLATELET # BLD AUTO: 322 K/UL — SIGNIFICANT CHANGE UP (ref 150–400)
POTASSIUM SERPL-MCNC: 3.8 MMOL/L — SIGNIFICANT CHANGE UP (ref 3.5–5.3)
POTASSIUM SERPL-SCNC: 3.8 MMOL/L — SIGNIFICANT CHANGE UP (ref 3.5–5.3)
RBC # BLD: 3.59 M/UL — LOW (ref 4.2–5.8)
RBC # FLD: 14.4 % — SIGNIFICANT CHANGE UP (ref 10.3–14.5)
SODIUM SERPL-SCNC: 133 MMOL/L — LOW (ref 135–145)
WBC # BLD: 8.49 K/UL — SIGNIFICANT CHANGE UP (ref 3.8–10.5)
WBC # FLD AUTO: 8.49 K/UL — SIGNIFICANT CHANGE UP (ref 3.8–10.5)

## 2021-11-26 PROCEDURE — 99239 HOSP IP/OBS DSCHRG MGMT >30: CPT

## 2021-11-26 RX ORDER — SIMETHICONE 80 MG/1
1 TABLET, CHEWABLE ORAL
Qty: 0 | Refills: 0 | DISCHARGE
Start: 2021-11-26

## 2021-11-26 RX ORDER — ACETAMINOPHEN 500 MG
2 TABLET ORAL
Qty: 0 | Refills: 0 | DISCHARGE
Start: 2021-11-26

## 2021-11-26 RX ORDER — OXYCODONE HYDROCHLORIDE 5 MG/1
1 TABLET ORAL
Qty: 16 | Refills: 0
Start: 2021-11-26

## 2021-11-26 RX ORDER — SENNA PLUS 8.6 MG/1
2 TABLET ORAL
Qty: 0 | Refills: 0 | DISCHARGE
Start: 2021-11-26

## 2021-11-26 RX ORDER — OXYCODONE HYDROCHLORIDE 5 MG/1
1 TABLET ORAL
Qty: 0 | Refills: 0 | DISCHARGE
Start: 2021-11-26

## 2021-11-26 RX ORDER — POLYETHYLENE GLYCOL 3350 17 G/17G
17 POWDER, FOR SOLUTION ORAL
Qty: 0 | Refills: 0 | DISCHARGE
Start: 2021-11-26

## 2021-11-26 RX ORDER — ACETAMINOPHEN 500 MG
2 TABLET ORAL
Qty: 0 | Refills: 0 | DISCHARGE

## 2021-11-26 RX ADMIN — HEPARIN SODIUM 5000 UNIT(S): 5000 INJECTION INTRAVENOUS; SUBCUTANEOUS at 06:06

## 2021-11-26 RX ADMIN — POLYETHYLENE GLYCOL 3350 17 GRAM(S): 17 POWDER, FOR SOLUTION ORAL at 09:18

## 2021-11-26 RX ADMIN — OXYCODONE HYDROCHLORIDE 10 MILLIGRAM(S): 5 TABLET ORAL at 00:43

## 2021-11-26 RX ADMIN — HYDROMORPHONE HYDROCHLORIDE 1 MILLIGRAM(S): 2 INJECTION INTRAMUSCULAR; INTRAVENOUS; SUBCUTANEOUS at 08:20

## 2021-11-26 RX ADMIN — OXYCODONE HYDROCHLORIDE 10 MILLIGRAM(S): 5 TABLET ORAL at 06:42

## 2021-11-26 RX ADMIN — HYDROMORPHONE HYDROCHLORIDE 1 MILLIGRAM(S): 2 INJECTION INTRAMUSCULAR; INTRAVENOUS; SUBCUTANEOUS at 07:55

## 2021-11-26 RX ADMIN — OXYCODONE HYDROCHLORIDE 10 MILLIGRAM(S): 5 TABLET ORAL at 00:13

## 2021-11-26 RX ADMIN — HYDROMORPHONE HYDROCHLORIDE 1 MILLIGRAM(S): 2 INJECTION INTRAMUSCULAR; INTRAVENOUS; SUBCUTANEOUS at 03:27

## 2021-11-26 RX ADMIN — OXYCODONE HYDROCHLORIDE 10 MILLIGRAM(S): 5 TABLET ORAL at 06:12

## 2021-11-26 RX ADMIN — HYDROMORPHONE HYDROCHLORIDE 1 MILLIGRAM(S): 2 INJECTION INTRAMUSCULAR; INTRAVENOUS; SUBCUTANEOUS at 02:57

## 2021-11-26 NOTE — DISCHARGE NOTE PROVIDER - NSDCMRMEDTOKEN_GEN_ALL_CORE_FT
acetaminophen 325 mg oral tablet: 2 tab(s) orally every 6 hours, As needed, Temp greater or equal to 38C (100.4F), Mild Pain (1 - 3)  oxyCODONE 10 mg oral tablet: 1 tab(s) orally every 4 hours, As needed, Moderate Pain (4 - 6)  polyethylene glycol 3350 oral powder for reconstitution: 17 gram(s) orally once a day  senna oral tablet: 2 tab(s) orally once a day (at bedtime)  simethicone 80 mg oral tablet, chewable: 1 tab(s) orally every 6 hours, As needed, Gas

## 2021-11-26 NOTE — DISCHARGE NOTE PROVIDER - HOSPITAL COURSE
37 yo pt presents to ED after fall ~7 steps.  Pt states he fell going down stairs the night before. + loc.  + etoh.  Pt had increasing pain to lower abd.  No vomit, no diarrhea.  No fever.  + pain right wrist.    CT scan showed evidence of traumatic renal infarcts and no other pathology  Xray of the right hand showed right Triquetral fracture, splinted by orthopedics  Patient was admitted to the hospital for observation and pain management  CT scan was repeated and showed stable left renal infarct with no hemoperitoneum  He was seen and examined at the bedside this morning  Pain is controlled  Ambulating and tolerating diet  He was given his discharge instructions and his medications were sent to the pharmacy

## 2021-11-26 NOTE — DISCHARGE NOTE PROVIDER - CARE PROVIDER_API CALL
Simeon Brown)  Orthopaedic Surgery; Surgery of the Hand  [address] [place] [address]  Phone: [phone]  Fax: [phone]  Follow Up Time: 1 week    Unique May ()  Surgery  [address] [facility], [address] [place] [address]  Phone: [phone]  Fax: [phone]  Follow Up Time: 2 weeks

## 2021-11-26 NOTE — DISCHARGE NOTE PROVIDER - PROVIDER TOKENS
PROVIDER:[TOKEN:[[id number]],FOLLOWUP:[1 week]],PROVIDER:[TOKEN:[[id number]],FOLLOWUP:[2 weeks]]

## 2021-11-26 NOTE — DISCHARGE NOTE PROVIDER - NSDCCPCAREPLAN_GEN_ALL_CORE_FT
PRINCIPAL DISCHARGE DIAGNOSIS  Diagnosis: Renal hematoma  Assessment and Plan of Treatment:       SECONDARY DISCHARGE DIAGNOSES  Diagnosis: Fall  Assessment and Plan of Treatment:     Diagnosis: Closed head injury  Assessment and Plan of Treatment:     Diagnosis: Abrasion  Assessment and Plan of Treatment:     Diagnosis: Wrist sprain  Assessment and Plan of Treatment:

## 2021-11-29 RX ORDER — OXYCODONE HYDROCHLORIDE 5 MG/1
1 TABLET ORAL
Qty: 30 | Refills: 0
Start: 2021-11-29 | End: 2021-12-03

## 2021-12-01 DIAGNOSIS — F10.10 ALCOHOL ABUSE, UNCOMPLICATED: ICD-10-CM

## 2021-12-01 DIAGNOSIS — N17.9 ACUTE KIDNEY FAILURE, UNSPECIFIED: ICD-10-CM

## 2021-12-01 DIAGNOSIS — F17.210 NICOTINE DEPENDENCE, CIGARETTES, UNCOMPLICATED: ICD-10-CM

## 2021-12-01 DIAGNOSIS — S37.812A CONTUSION OF ADRENAL GLAND, INITIAL ENCOUNTER: ICD-10-CM

## 2021-12-01 DIAGNOSIS — S37.011A MINOR CONTUSION OF RIGHT KIDNEY, INITIAL ENCOUNTER: ICD-10-CM

## 2021-12-01 DIAGNOSIS — R40.2412 GLASGOW COMA SCALE SCORE 13-15, AT ARRIVAL TO EMERGENCY DEPARTMENT: ICD-10-CM

## 2021-12-01 DIAGNOSIS — D62 ACUTE POSTHEMORRHAGIC ANEMIA: ICD-10-CM

## 2021-12-01 DIAGNOSIS — S62.111A DISPLACED FRACTURE OF TRIQUETRUM [CUNEIFORM] BONE, RIGHT WRIST, INITIAL ENCOUNTER FOR CLOSED FRACTURE: ICD-10-CM
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
## 2021-12-01 DIAGNOSIS — S06.9X9A UNSPECIFIED INTRACRANIAL INJURY WITH LOSS OF CONSCIOUSNESS OF UNSPECIFIED DURATION, INITIAL ENCOUNTER: ICD-10-CM
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
## 2021-12-01 DIAGNOSIS — I10 ESSENTIAL (PRIMARY) HYPERTENSION: ICD-10-CM

## 2021-12-01 DIAGNOSIS — R94.5 ABNORMAL RESULTS OF LIVER FUNCTION STUDIES: ICD-10-CM

## 2021-12-01 DIAGNOSIS — S20.20XA CONTUSION OF THORAX, UNSPECIFIED, INITIAL ENCOUNTER: ICD-10-CM
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
## 2021-12-01 DIAGNOSIS — Z91.14 PATIENT'S OTHER NONCOMPLIANCE WITH MEDICATION REGIMEN: ICD-10-CM

## 2021-12-01 DIAGNOSIS — S37.031A LACERATION OF RIGHT KIDNEY, UNSPECIFIED DEGREE, INITIAL ENCOUNTER: ICD-10-CM

## 2021-12-01 DIAGNOSIS — T14.8XXA OTHER INJURY OF UNSPECIFIED BODY REGION, INITIAL ENCOUNTER: ICD-10-CM
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
## 2021-12-01 DIAGNOSIS — T79.6XXA TRAUMATIC ISCHEMIA OF MUSCLE, INITIAL ENCOUNTER: ICD-10-CM

## 2021-12-01 DIAGNOSIS — E87.1 HYPO-OSMOLALITY AND HYPONATREMIA: ICD-10-CM

## 2021-12-01 DIAGNOSIS — R00.0 TACHYCARDIA, UNSPECIFIED: ICD-10-CM

## 2021-12-02 ENCOUNTER — NON-APPOINTMENT (OUTPATIENT)
Age: 36
End: 2021-12-02

## 2021-12-06 ENCOUNTER — APPOINTMENT (OUTPATIENT)
Dept: SURGERY | Facility: CLINIC | Age: 36
End: 2021-12-06
Payer: SELF-PAY

## 2021-12-06 VITALS
DIASTOLIC BLOOD PRESSURE: 115 MMHG | OXYGEN SATURATION: 98 % | HEART RATE: 114 BPM | SYSTOLIC BLOOD PRESSURE: 156 MMHG | TEMPERATURE: 98 F

## 2021-12-06 PROCEDURE — 99213 OFFICE O/P EST LOW 20 MIN: CPT

## 2021-12-06 NOTE — REVIEW OF SYSTEMS
[As Noted in HPI] : as noted in HPI [Limb Pain] : limb pain [Negative] : Heme/Lymph [FreeTextEntry9] : right wrist pain from known wrist fracture pathology

## 2021-12-06 NOTE — PHYSICAL EXAM
[JVD] : no jugular venous distention  [Normal Breath Sounds] : Normal breath sounds [Normal Heart Sounds] : normal heart sounds [No Rash or Lesion] : No rash or lesion [Alert] : alert [Oriented to Person] : oriented to person [Oriented to Place] : oriented to place [Oriented to Time] : oriented to time [de-identified] : no distress [de-identified] : NCAT [de-identified] : BS+, soft, non distended. + left flank tenderness and left upper>lower abdominal tenderness to exam, no pain out of proprtion to exam, no radiation, no rebound/guarding/rigidity [de-identified] : known right wrist fracture, pt removed splint [de-identified] : normal affect

## 2021-12-06 NOTE — ASSESSMENT
[FreeTextEntry1] : A/P\par Left renal laceration with hemorrhage, stable HD and labs while hospitalized\par Left adrenal infarct\par Right wrist fratcure\par Pt to follow up with Dr Fraga of urology for renal/adrenal management/evaluation\par Pt to follow up with his PMD for medical care/management\par Pt to follow up Dr Brown for right wrist management/evaluation\par Pt advised to seek immediate medical attention for worsening abominal pain, fever, chest pain, shortness of breath, any adverse changes to health\par RTO PRN

## 2021-12-20 ENCOUNTER — APPOINTMENT (OUTPATIENT)
Dept: UROLOGY | Facility: CLINIC | Age: 36
End: 2021-12-20
Payer: SELF-PAY

## 2021-12-20 VITALS
HEIGHT: 66 IN | BODY MASS INDEX: 35.36 KG/M2 | HEART RATE: 74 BPM | WEIGHT: 220 LBS | DIASTOLIC BLOOD PRESSURE: 91 MMHG | OXYGEN SATURATION: 99 % | SYSTOLIC BLOOD PRESSURE: 133 MMHG

## 2021-12-20 DIAGNOSIS — S37.019S: ICD-10-CM

## 2021-12-20 PROCEDURE — 99214 OFFICE O/P EST MOD 30 MIN: CPT

## 2021-12-20 RX ORDER — NAPROXEN SODIUM 500 MG/1
500 TABLET, FILM COATED, EXTENDED RELEASE ORAL DAILY
Qty: 14 | Refills: 0 | Status: ACTIVE | COMMUNITY
Start: 2021-12-20 | End: 1900-01-01

## 2021-12-20 NOTE — END OF VISIT
[FreeTextEntry3] : Unless his symptoms symptoms change drastically he will have a CAT scan 1 month to check on how the kidney is progressing.  I advised him to keep getting his blood pressure checked for the next year.  Will avoid strenuous activities.

## 2021-12-20 NOTE — HISTORY OF PRESENT ILLNESS
[FreeTextEntry1] : This patient fell downstairs approximately 1 month ago and sustained a renal hematoma.  He has mild flank pain although seems to be improving.  His blood pressure today was normal.

## 2021-12-20 NOTE — REVIEW OF SYSTEMS
[Abdominal Pain] : abdominal pain [base] : pain in base of penis [Joint Pain] : joint pain [Joint Swelling] : joint swelling [Difficulty Walking] : difficulty walking [see HPI] : see HPI [Negative] : Neurological

## 2022-04-11 PROBLEM — Z11.59 SCREENING FOR VIRAL DISEASE: Status: ACTIVE | Noted: 2021-02-12

## 2022-05-31 ENCOUNTER — APPOINTMENT (OUTPATIENT)
Dept: CARDIOLOGY | Facility: CLINIC | Age: 37
End: 2022-05-31
Payer: COMMERCIAL

## 2022-05-31 ENCOUNTER — NON-APPOINTMENT (OUTPATIENT)
Age: 37
End: 2022-05-31

## 2022-05-31 VITALS
DIASTOLIC BLOOD PRESSURE: 115 MMHG | WEIGHT: 219 LBS | HEIGHT: 66 IN | BODY MASS INDEX: 35.2 KG/M2 | HEART RATE: 85 BPM | OXYGEN SATURATION: 99 % | SYSTOLIC BLOOD PRESSURE: 155 MMHG

## 2022-05-31 DIAGNOSIS — E78.5 HYPERLIPIDEMIA, UNSPECIFIED: ICD-10-CM

## 2022-05-31 DIAGNOSIS — I10 ESSENTIAL (PRIMARY) HYPERTENSION: ICD-10-CM

## 2022-05-31 DIAGNOSIS — G47.33 OBSTRUCTIVE SLEEP APNEA (ADULT) (PEDIATRIC): ICD-10-CM

## 2022-05-31 DIAGNOSIS — E78.00 PURE HYPERCHOLESTEROLEMIA, UNSPECIFIED: ICD-10-CM

## 2022-05-31 PROCEDURE — 99214 OFFICE O/P EST MOD 30 MIN: CPT

## 2022-05-31 PROCEDURE — 93000 ELECTROCARDIOGRAM COMPLETE: CPT

## 2022-05-31 RX ORDER — OXYCODONE 5 MG/1
5 TABLET ORAL
Qty: 144 | Refills: 0 | Status: COMPLETED | COMMUNITY
Start: 2021-12-06

## 2022-05-31 RX ORDER — METOPROLOL SUCCINATE 100 MG/1
100 TABLET, EXTENDED RELEASE ORAL DAILY
Qty: 90 | Refills: 3 | Status: DISCONTINUED | COMMUNITY
End: 2022-05-31

## 2022-05-31 RX ORDER — AMLODIPINE BESYLATE 5 MG/1
5 TABLET ORAL
Qty: 30 | Refills: 5 | Status: ACTIVE | COMMUNITY
Start: 2022-05-31 | End: 1900-01-01

## 2022-05-31 NOTE — DISCUSSION/SUMMARY
[FreeTextEntry1] : The patient was examined.  His blood pressure was 155/115, and his pulse was 85.  His lungs were clear to auscultation.  Cardiac exam was negative for murmurs rubs or gallops.  His EKG showed normal sinus rhythm and was within normal limits. .  The patient on amlodipine 5 mg daily.  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  Total time spent on the day of the encounter was 33 minutes which includes  face-to-face and non face-to-face times personally spent by the physician preparing to see the patient, obtaining  separately obtained history, performing a medically appropriate exam and evaluation, counseling, educating, talking to the family or caregivers, ordering medicines, ordering tests or procedures, referring and communicating with other healthcare  professionals, and documenting clinical information in the electronic health record.

## 2022-05-31 NOTE — REASON FOR VISIT
[FreeTextEntry1] : This is the 2nd  office visit for this 37 -year-old black male who was referred because of hypertension and sinus tachycardia.  The patient was recently placed on Toprol-XL.  First he was on 25 mg and then was increased to 2 tablets daily.  Upon questioning the patient also has a problem with snoring according to his girlfriend and he sometimes feels like he is waking up with shortness of breath..\par May 31, 2022: The patient comes in because of hypertension.  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  He had stopped his medication a year ago.  He is staying away from caffeine.  He only has chest pains that last for seconds.  He denies any shortness of breath or palpitations.\par   He has on average four alcoholic beverages on the weekend.  He was never  a smoker.\par \par His mother has a history of hypertension.\par \par He takes Toprol-XL 50 mg and vitamin D.\par \par He has no known drug allergies.\par \par He denies any chest pains or palpitations but does have the previously mentioned shortness of breath intermittently when he wakes up.

## 2022-06-07 ENCOUNTER — APPOINTMENT (OUTPATIENT)
Dept: CARDIOLOGY | Facility: CLINIC | Age: 37
End: 2022-06-07

## 2022-07-18 ENCOUNTER — APPOINTMENT (OUTPATIENT)
Dept: INTERNAL MEDICINE | Facility: CLINIC | Age: 37
End: 2022-07-18

## 2023-03-16 NOTE — PATIENT PROFILE ADULT - FLU SEASON?
- c/w home atorvastatin 40mg QD - c/w home atorvastatin 40mg QD - c/w home atorvastatin 40mg QD Yes...

## 2024-06-24 NOTE — PATIENT PROFILE ADULT - MONEY FOR FOOD
Show Spray Paint Technique Variable?: Yes Application Tool (Optional): Liquid Nitrogen Sprayer Medical Necessity Information: It is in your best interest to select a reason for this procedure from the list below. All of these items fulfill various CMS LCD requirements except the new and changing color options. Post-Care Instructions: I reviewed with the patient in detail post-care instructions. Patient is to wear sunprotection, and avoid picking at any of the treated lesions. Pt may apply Vaseline to crusted or scabbing areas. Duration Of Freeze Thaw-Cycle (Seconds): 5-10 Detail Level: Detailed Number Of Freeze-Thaw Cycles: 1 freeze-thaw cycle Render Note In Bullet Format When Appropriate: No Spray Paint Text: The liquid nitrogen was applied to the skin utilizing a spray paint frosting technique. Medical Necessity Clause: This procedure was medically necessary because the lesions that were treated were: Consent: The patient's consent was obtained including but not limited to risks of crusting, scabbing, blistering, scarring, darker or lighter pigmentary change, recurrence, incomplete removal and infection. no